# Patient Record
Sex: FEMALE | Race: OTHER | Employment: OTHER | URBAN - METROPOLITAN AREA
[De-identification: names, ages, dates, MRNs, and addresses within clinical notes are randomized per-mention and may not be internally consistent; named-entity substitution may affect disease eponyms.]

---

## 2017-01-01 ENCOUNTER — TELEPHONE (OUTPATIENT)
Dept: OTHER | Age: 82
End: 2017-01-01

## 2017-01-01 ENCOUNTER — APPOINTMENT (OUTPATIENT)
Dept: WOUND CARE | Facility: HOSPITAL | Age: 82
End: 2017-01-01
Payer: MEDICARE

## 2017-01-01 ENCOUNTER — APPOINTMENT (OUTPATIENT)
Dept: WOUND CARE | Facility: HOSPITAL | Age: 82
End: 2017-01-01
Attending: CLINICAL NURSE SPECIALIST
Payer: MEDICARE

## 2017-01-01 ENCOUNTER — HOSPITAL ENCOUNTER (OUTPATIENT)
Dept: GENERAL RADIOLOGY | Age: 82
Discharge: HOME | End: 2017-01-01
Attending: INTERNAL MEDICINE | Admitting: INTERNAL MEDICINE
Payer: MEDICARE

## 2017-01-01 ENCOUNTER — TELEPHONE (OUTPATIENT)
Dept: INTERNAL MEDICINE CLINIC | Facility: CLINIC | Age: 82
End: 2017-01-01

## 2017-01-01 ENCOUNTER — PATIENT OUTREACH (OUTPATIENT)
Dept: CASE MANAGEMENT | Age: 82
End: 2017-01-01

## 2017-01-01 ENCOUNTER — TELEPHONE (OUTPATIENT)
Dept: PULMONOLOGY | Facility: CLINIC | Age: 82
End: 2017-01-01

## 2017-01-01 ENCOUNTER — NURSE TRIAGE (OUTPATIENT)
Dept: OTHER | Age: 82
End: 2017-01-01

## 2017-01-01 ENCOUNTER — OFFICE VISIT (OUTPATIENT)
Dept: INTERNAL MEDICINE CLINIC | Facility: CLINIC | Age: 82
End: 2017-01-01

## 2017-01-01 ENCOUNTER — HOSPITAL ENCOUNTER (OUTPATIENT)
Dept: ULTRASOUND IMAGING | Facility: HOSPITAL | Age: 82
Discharge: HOME | End: 2017-01-01
Attending: INTERNAL MEDICINE
Payer: MEDICARE

## 2017-01-01 ENCOUNTER — OFFICE VISIT (OUTPATIENT)
Dept: PULMONOLOGY | Facility: CLINIC | Age: 82
End: 2017-01-01

## 2017-01-01 ENCOUNTER — HOSPITAL ENCOUNTER (OUTPATIENT)
Dept: GENERAL RADIOLOGY | Facility: HOSPITAL | Age: 82
Discharge: HOME | End: 2017-01-01
Attending: INTERNAL MEDICINE
Payer: MEDICARE

## 2017-01-01 ENCOUNTER — APPOINTMENT (OUTPATIENT)
Dept: WOUND CARE | Facility: HOSPITAL | Age: 82
End: 2017-01-01
Attending: NURSE PRACTITIONER
Payer: MEDICARE

## 2017-01-01 VITALS
RESPIRATION RATE: 20 BRPM | DIASTOLIC BLOOD PRESSURE: 72 MMHG | OXYGEN SATURATION: 94 % | SYSTOLIC BLOOD PRESSURE: 113 MMHG | HEART RATE: 92 BPM | HEIGHT: 60 IN | BODY MASS INDEX: 46.72 KG/M2 | WEIGHT: 238 LBS

## 2017-01-01 VITALS
DIASTOLIC BLOOD PRESSURE: 60 MMHG | TEMPERATURE: 98 F | WEIGHT: 244.13 LBS | BODY MASS INDEX: 48 KG/M2 | SYSTOLIC BLOOD PRESSURE: 102 MMHG | OXYGEN SATURATION: 97 % | HEART RATE: 78 BPM

## 2017-01-01 VITALS
HEART RATE: 83 BPM | SYSTOLIC BLOOD PRESSURE: 117 MMHG | OXYGEN SATURATION: 95 % | DIASTOLIC BLOOD PRESSURE: 63 MMHG | RESPIRATION RATE: 18 BRPM

## 2017-01-01 VITALS
WEIGHT: 240.31 LBS | TEMPERATURE: 98 F | DIASTOLIC BLOOD PRESSURE: 68 MMHG | BODY MASS INDEX: 47 KG/M2 | SYSTOLIC BLOOD PRESSURE: 104 MMHG | HEART RATE: 73 BPM

## 2017-01-01 DIAGNOSIS — I50.9 ACUTE ON CHRONIC CONGESTIVE HEART FAILURE, UNSPECIFIED CONGESTIVE HEART FAILURE TYPE: ICD-10-CM

## 2017-01-01 DIAGNOSIS — R05.9 COUGH: ICD-10-CM

## 2017-01-01 DIAGNOSIS — R05.9 COUGH: Primary | ICD-10-CM

## 2017-01-01 DIAGNOSIS — J90 PLEURAL EFFUSION, LEFT: Primary | ICD-10-CM

## 2017-01-01 DIAGNOSIS — R73.9 HYPERGLYCEMIA: ICD-10-CM

## 2017-01-01 DIAGNOSIS — G47.33 OSA TREATED WITH BIPAP: ICD-10-CM

## 2017-01-01 DIAGNOSIS — S81.802A WOUND OF LEFT LOWER EXTREMITY, INITIAL ENCOUNTER: Primary | ICD-10-CM

## 2017-01-01 DIAGNOSIS — S81.802A WOUND OF LEFT LOWER EXTREMITY, INITIAL ENCOUNTER: ICD-10-CM

## 2017-01-01 DIAGNOSIS — J90 PLEURAL EFFUSION: Primary | ICD-10-CM

## 2017-01-01 DIAGNOSIS — R06.02 SOB (SHORTNESS OF BREATH): ICD-10-CM

## 2017-01-01 DIAGNOSIS — J44.1 COPD EXACERBATION (HCC): ICD-10-CM

## 2017-01-01 DIAGNOSIS — J90 PLEURAL EFFUSION: ICD-10-CM

## 2017-01-01 PROCEDURE — 71010 XR CHEST AP PORTABLE  (CPT=71010): CPT | Performed by: INTERNAL MEDICINE

## 2017-01-01 PROCEDURE — 99213 OFFICE O/P EST LOW 20 MIN: CPT | Performed by: INTERNAL MEDICINE

## 2017-01-01 PROCEDURE — 29581 APPL MULTLAYER CMPRN SYS LEG: CPT

## 2017-01-01 PROCEDURE — 99490 CHRNC CARE MGMT STAFF 1ST 20: CPT | Performed by: INTERNAL MEDICINE

## 2017-01-01 PROCEDURE — 32555 ASPIRATE PLEURA W/ IMAGING: CPT | Performed by: INTERNAL MEDICINE

## 2017-01-01 PROCEDURE — 99213 OFFICE O/P EST LOW 20 MIN: CPT | Performed by: CLINICAL NURSE SPECIALIST

## 2017-01-01 PROCEDURE — G0463 HOSPITAL OUTPT CLINIC VISIT: HCPCS | Performed by: INTERNAL MEDICINE

## 2017-01-01 PROCEDURE — 71020 XR CHEST PA + LAT CHEST (CPT=71020): CPT | Performed by: INTERNAL MEDICINE

## 2017-01-01 RX ORDER — AZITHROMYCIN 250 MG/1
TABLET, FILM COATED ORAL
Qty: 6 TABLET | Refills: 0 | Status: SHIPPED | OUTPATIENT
Start: 2017-01-01 | End: 2017-01-01

## 2017-01-01 RX ORDER — HYDROCODONE BITARTRATE AND ACETAMINOPHEN 5; 325 MG/1; MG/1
1 TABLET ORAL EVERY 6 HOURS PRN
Qty: 100 TABLET | Refills: 0 | Status: SHIPPED | OUTPATIENT
Start: 2017-01-01 | End: 2018-01-01

## 2017-01-01 RX ORDER — FUROSEMIDE 40 MG/1
40 TABLET ORAL 2 TIMES DAILY
Qty: 180 TABLET | Refills: 0 | Status: SHIPPED | OUTPATIENT
Start: 2017-01-01 | End: 2018-01-01

## 2017-01-01 RX ORDER — BENZONATATE 200 MG/1
200 CAPSULE ORAL 3 TIMES DAILY PRN
Qty: 12 CAPSULE | Refills: 0 | Status: SHIPPED | OUTPATIENT
Start: 2017-01-01 | End: 2017-01-01

## 2017-01-01 RX ORDER — METHIMAZOLE 10 MG/1
10 TABLET ORAL DAILY
Qty: 90 TABLET | Refills: 3 | Status: SHIPPED | OUTPATIENT
Start: 2017-01-01 | End: 2018-01-01

## 2017-01-01 RX ORDER — MECLIZINE HCL 12.5 MG/1
12.5 TABLET ORAL 3 TIMES DAILY PRN
Qty: 180 TABLET | Refills: 3 | Status: ON HOLD | OUTPATIENT
Start: 2017-01-01 | End: 2018-01-01

## 2017-01-01 RX ORDER — POTASSIUM CHLORIDE 20 MEQ/1
20 TABLET, EXTENDED RELEASE ORAL 2 TIMES DAILY
Qty: 180 TABLET | Refills: 0 | Status: SHIPPED | OUTPATIENT
Start: 2017-01-01 | End: 2018-01-01

## 2017-01-01 RX ORDER — VALSARTAN 80 MG/1
40 TABLET ORAL DAILY
Qty: 90 TABLET | Refills: 3 | Status: SHIPPED | OUTPATIENT
Start: 2017-01-01 | End: 2018-01-01

## 2017-01-01 RX ORDER — POTASSIUM CHLORIDE 20 MEQ/1
20 TABLET, EXTENDED RELEASE ORAL 2 TIMES DAILY
Qty: 180 TABLET | Refills: 0 | Status: CANCELLED | OUTPATIENT
Start: 2017-01-01

## 2017-01-01 RX ORDER — FLUTICASONE PROPIONATE 50 MCG
2 SPRAY, SUSPENSION (ML) NASAL DAILY
Qty: 1 BOTTLE | Refills: 3 | Status: SHIPPED | OUTPATIENT
Start: 2017-01-01 | End: 2018-01-01

## 2017-01-01 RX ORDER — POTASSIUM CHLORIDE 20 MEQ/1
20 TABLET, EXTENDED RELEASE ORAL
Qty: 90 TABLET | Refills: 0 | Status: SHIPPED | OUTPATIENT
Start: 2017-01-01 | End: 2017-01-01

## 2017-01-01 RX ORDER — FUROSEMIDE 40 MG/1
TABLET ORAL
Qty: 180 TABLET | Refills: 0 | Status: SHIPPED | OUTPATIENT
Start: 2017-01-01 | End: 2018-01-01

## 2017-01-11 ENCOUNTER — HOSPITAL (OUTPATIENT)
Dept: OTHER | Age: 82
End: 2017-01-11
Attending: OTOLARYNGOLOGY

## 2017-01-11 LAB — CREATININE, POC: 0.7 MG/DL (ref 0.51–0.95)

## 2017-01-26 ENCOUNTER — APPOINTMENT (OUTPATIENT)
Dept: CV DIAGNOSTICS | Facility: HOSPITAL | Age: 82
DRG: 193 | End: 2017-01-26
Attending: HOSPITALIST
Payer: MEDICARE

## 2017-01-26 ENCOUNTER — APPOINTMENT (OUTPATIENT)
Dept: GENERAL RADIOLOGY | Facility: HOSPITAL | Age: 82
DRG: 193 | End: 2017-01-26
Attending: EMERGENCY MEDICINE
Payer: MEDICARE

## 2017-01-26 ENCOUNTER — HOSPITAL ENCOUNTER (INPATIENT)
Facility: HOSPITAL | Age: 82
LOS: 4 days | Discharge: HOME | DRG: 193 | End: 2017-01-30
Attending: EMERGENCY MEDICINE | Admitting: HOSPITALIST
Payer: MEDICARE

## 2017-01-26 DIAGNOSIS — R77.8 ELEVATED TROPONIN: ICD-10-CM

## 2017-01-26 DIAGNOSIS — J44.1 COPD EXACERBATION (HCC): ICD-10-CM

## 2017-01-26 DIAGNOSIS — I48.91 ATRIAL FIBRILLATION, NEW ONSET (HCC): Primary | ICD-10-CM

## 2017-01-26 DIAGNOSIS — I50.9 ACUTE ON CHRONIC CONGESTIVE HEART FAILURE, UNSPECIFIED CONGESTIVE HEART FAILURE TYPE: ICD-10-CM

## 2017-01-26 PROBLEM — R79.89 AZOTEMIA: Status: ACTIVE | Noted: 2017-01-26

## 2017-01-26 PROBLEM — R79.89 ELEVATED TROPONIN: Status: ACTIVE | Noted: 2017-01-26

## 2017-01-26 PROBLEM — R73.9 HYPERGLYCEMIA: Status: ACTIVE | Noted: 2017-01-26

## 2017-01-26 LAB
ADENOVIRUS PCR:: NEGATIVE
ANION GAP SERPL CALC-SCNC: 9 MMOL/L (ref 0–18)
B PERT DNA SPEC QL NAA+PROBE: NEGATIVE
BASOPHILS # BLD: 0.1 K/UL (ref 0–0.2)
BASOPHILS NFR BLD: 1 %
BILIRUB UR QL: NEGATIVE
BNP SERPL-MCNC: 230 PG/ML (ref 0–100)
BUN SERPL-MCNC: 14 MG/DL (ref 8–20)
BUN/CREAT SERPL: 21.5 (ref 10–20)
C PNEUM DNA SPEC QL NAA+PROBE: NEGATIVE
CALCIUM SERPL-MCNC: 9.2 MG/DL (ref 8.5–10.5)
CHLORIDE SERPL-SCNC: 102 MMOL/L (ref 95–110)
CHOLEST SERPL-MCNC: 168 MG/DL (ref 110–200)
CLARITY UR: CLEAR
CO2 SERPL-SCNC: 27 MMOL/L (ref 22–32)
COLOR UR: YELLOW
CORONAVIRUS 229E PCR:: NEGATIVE
CORONAVIRUS HKU1 PCR:: NEGATIVE
CORONAVIRUS NL63 PCR:: NEGATIVE
CORONAVIRUS OC43 PCR:: POSITIVE
CREAT SERPL-MCNC: 0.65 MG/DL (ref 0.5–1.5)
EOSINOPHIL # BLD: 0 K/UL (ref 0–0.7)
EOSINOPHIL NFR BLD: 0 %
ERYTHROCYTE [DISTWIDTH] IN BLOOD BY AUTOMATED COUNT: 14.8 % (ref 11–15)
FLUAV + FLUBV RNA SPEC NAA+PROBE: NEGATIVE
FLUAV H1 2009 PAND RNA SPEC QL NAA+PROBE: NEGATIVE
FLUAV H1 RNA SPEC QL NAA+PROBE: NEGATIVE
FLUAV H3 RNA SPEC QL NAA+PROBE: NEGATIVE
FLUAV RNA SPEC QL NAA+PROBE: NEGATIVE
FLUBV RNA SPEC QL NAA+PROBE: NEGATIVE
GLUCOSE SERPL-MCNC: 139 MG/DL (ref 70–99)
GLUCOSE UR-MCNC: NEGATIVE MG/DL
HCT VFR BLD AUTO: 48 % (ref 35–48)
HDLC SERPL-MCNC: 48 MG/DL
HGB BLD-MCNC: 15.8 G/DL (ref 12–16)
HGB UR QL STRIP.AUTO: NEGATIVE
KETONES UR-MCNC: NEGATIVE MG/DL
LDLC SERPL CALC-MCNC: 95 MG/DL (ref 0–99)
LEUKOCYTE ESTERASE UR QL STRIP.AUTO: NEGATIVE
LYMPHOCYTES # BLD: 0.7 K/UL (ref 1–4)
LYMPHOCYTES NFR BLD: 7 %
MCH RBC QN AUTO: 31 PG (ref 27–32)
MCHC RBC AUTO-ENTMCNC: 33 G/DL (ref 32–37)
MCV RBC AUTO: 94.1 FL (ref 80–100)
METAPNEUMOVIRUS PCR:: NEGATIVE
MONOCYTES # BLD: 0.6 K/UL (ref 0–1)
MONOCYTES NFR BLD: 6 %
MYCOPLASMA PNEUMONIA PCR:: NEGATIVE
NEUTROPHILS # BLD AUTO: 9.2 K/UL (ref 1.8–7.7)
NEUTROPHILS NFR BLD: 86 %
NITRITE UR QL STRIP.AUTO: NEGATIVE
NONHDLC SERPL-MCNC: 120 MG/DL
OSMOLALITY UR CALC.SUM OF ELEC: 289 MOSM/KG (ref 275–295)
PARAINFLUENZA 1 PCR:: NEGATIVE
PARAINFLUENZA 2 PCR:: NEGATIVE
PARAINFLUENZA 3 PCR:: NEGATIVE
PARAINFLUENZA 4 PCR:: NEGATIVE
PH UR: 6 [PH] (ref 5–8)
PLATELET # BLD AUTO: 173 K/UL (ref 140–400)
PMV BLD AUTO: 9.7 FL (ref 7.4–10.3)
POTASSIUM SERPL-SCNC: 3.7 MMOL/L (ref 3.3–5.1)
PROT UR-MCNC: NEGATIVE MG/DL
RBC # BLD AUTO: 5.1 M/UL (ref 3.7–5.4)
RHINOVIRUS/ENTERO PCR:: NEGATIVE
RSV RNA SPEC QL NAA+PROBE: NEGATIVE
SODIUM SERPL-SCNC: 138 MMOL/L (ref 136–144)
SP GR UR STRIP: 1.01 (ref 1–1.03)
T3 SERPL-MCNC: 0.52 NG/ML (ref 0.87–1.78)
T4 FREE SERPL-MCNC: 1.09 NG/DL (ref 0.58–1.64)
TRIGL SERPL-MCNC: 125 MG/DL (ref 1–149)
TROPONIN I SERPL-MCNC: 0.04 NG/ML (ref ?–0.03)
TSH SERPL-ACNC: 0.29 UIU/ML (ref 0.34–5.6)
UROBILINOGEN UR STRIP-ACNC: <2
VIT C UR-MCNC: NEGATIVE MG/DL
WBC # BLD AUTO: 10.7 K/UL (ref 4–11)

## 2017-01-26 PROCEDURE — 71010 XR CHEST AP PORTABLE  (CPT=71010): CPT

## 2017-01-26 PROCEDURE — 99223 1ST HOSP IP/OBS HIGH 75: CPT | Performed by: HOSPITALIST

## 2017-01-26 PROCEDURE — 93306 TTE W/DOPPLER COMPLETE: CPT

## 2017-01-26 PROCEDURE — B24BZZ4 ULTRASONOGRAPHY OF HEART WITH AORTA, TRANSESOPHAGEAL: ICD-10-PCS | Performed by: HOSPITALIST

## 2017-01-26 PROCEDURE — 4A02XFZ MEASUREMENT OF CARDIAC RHYTHM, EXTERNAL APPROACH: ICD-10-PCS | Performed by: HOSPITALIST

## 2017-01-26 PROCEDURE — BW03ZZZ PLAIN RADIOGRAPHY OF CHEST: ICD-10-PCS | Performed by: HOSPITALIST

## 2017-01-26 PROCEDURE — 93306 TTE W/DOPPLER COMPLETE: CPT | Performed by: INTERNAL MEDICINE

## 2017-01-26 RX ORDER — MECLIZINE HCL 12.5 MG/1
12.5 TABLET ORAL 3 TIMES DAILY PRN
Status: DISCONTINUED | OUTPATIENT
Start: 2017-01-26 | End: 2017-01-30

## 2017-01-26 RX ORDER — DILTIAZEM HYDROCHLORIDE 5 MG/ML
10 INJECTION INTRAVENOUS ONCE
Status: COMPLETED | OUTPATIENT
Start: 2017-01-26 | End: 2017-01-26

## 2017-01-26 RX ORDER — OYSTER SHELL CALCIUM WITH VITAMIN D 500; 200 MG/1; [IU]/1
1 TABLET, FILM COATED ORAL DAILY
COMMUNITY
End: 2017-03-07 | Stop reason: ALTCHOICE

## 2017-01-26 RX ORDER — POTASSIUM CHLORIDE 20 MEQ/1
40 TABLET, EXTENDED RELEASE ORAL ONCE
Status: COMPLETED | OUTPATIENT
Start: 2017-01-26 | End: 2017-01-26

## 2017-01-26 RX ORDER — MELATONIN
800 DAILY
Status: ON HOLD | COMMUNITY
End: 2018-01-01

## 2017-01-26 RX ORDER — AZITHROMYCIN 250 MG/1
250 TABLET, FILM COATED ORAL DAILY
Status: ON HOLD | COMMUNITY
End: 2017-01-30

## 2017-01-26 RX ORDER — ASPIRIN 81 MG/1
81 TABLET ORAL DAILY
Status: DISCONTINUED | OUTPATIENT
Start: 2017-01-26 | End: 2017-01-26

## 2017-01-26 RX ORDER — GABAPENTIN 100 MG/1
100 CAPSULE ORAL NIGHTLY
Status: DISCONTINUED | OUTPATIENT
Start: 2017-01-26 | End: 2017-01-30

## 2017-01-26 RX ORDER — NITROGLYCERIN 0.4 MG/1
0.4 TABLET SUBLINGUAL EVERY 5 MIN PRN
Status: DISCONTINUED | OUTPATIENT
Start: 2017-01-26 | End: 2017-01-30

## 2017-01-26 RX ORDER — MULTIVITAMIN WITH IRON
250 TABLET ORAL DAILY
COMMUNITY
End: 2018-01-01

## 2017-01-26 RX ORDER — METHYLPREDNISOLONE SODIUM SUCCINATE 125 MG/2ML
125 INJECTION, POWDER, LYOPHILIZED, FOR SOLUTION INTRAMUSCULAR; INTRAVENOUS ONCE
Status: COMPLETED | OUTPATIENT
Start: 2017-01-26 | End: 2017-01-26

## 2017-01-26 RX ORDER — PANTOPRAZOLE SODIUM 40 MG/1
40 TABLET, DELAYED RELEASE ORAL
Status: DISCONTINUED | OUTPATIENT
Start: 2017-01-26 | End: 2017-01-30

## 2017-01-26 RX ORDER — IPRATROPIUM BROMIDE AND ALBUTEROL SULFATE 2.5; .5 MG/3ML; MG/3ML
3 SOLUTION RESPIRATORY (INHALATION) ONCE
Status: COMPLETED | OUTPATIENT
Start: 2017-01-26 | End: 2017-01-26

## 2017-01-26 RX ORDER — FUROSEMIDE 40 MG/1
40 TABLET ORAL DAILY
COMMUNITY
End: 2017-03-28

## 2017-01-26 RX ORDER — CHOLECALCIFEROL (VITAMIN D3) 1250 MCG
50000 CAPSULE ORAL SEE ADMIN INSTRUCTIONS
COMMUNITY
End: 2017-03-07 | Stop reason: ALTCHOICE

## 2017-01-26 RX ORDER — FUROSEMIDE 10 MG/ML
20 INJECTION INTRAMUSCULAR; INTRAVENOUS ONCE
Status: COMPLETED | OUTPATIENT
Start: 2017-01-26 | End: 2017-01-26

## 2017-01-26 RX ORDER — HYDROCODONE BITARTRATE AND ACETAMINOPHEN 5; 325 MG/1; MG/1
1 TABLET ORAL EVERY 6 HOURS PRN
COMMUNITY
End: 2017-02-23

## 2017-01-26 RX ORDER — GABAPENTIN 100 MG/1
100 CAPSULE ORAL NIGHTLY
COMMUNITY
End: 2017-10-17

## 2017-01-26 RX ORDER — ALBUTEROL SULFATE 2.5 MG/3ML
2.5 SOLUTION RESPIRATORY (INHALATION) 3 TIMES DAILY
COMMUNITY
End: 2017-02-01 | Stop reason: ALTCHOICE

## 2017-01-26 RX ORDER — DILTIAZEM HYDROCHLORIDE 60 MG/1
60 TABLET, FILM COATED ORAL AS NEEDED
Status: DISCONTINUED | OUTPATIENT
Start: 2017-01-26 | End: 2017-01-30

## 2017-01-26 RX ORDER — METHYLPREDNISOLONE SODIUM SUCCINATE 40 MG/ML
60 INJECTION, POWDER, LYOPHILIZED, FOR SOLUTION INTRAMUSCULAR; INTRAVENOUS EVERY 6 HOURS
Status: DISCONTINUED | OUTPATIENT
Start: 2017-01-26 | End: 2017-01-28

## 2017-01-26 RX ORDER — DILTIAZEM HYDROCHLORIDE 5 MG/ML
10 INJECTION INTRAVENOUS
Status: DISCONTINUED | OUTPATIENT
Start: 2017-01-26 | End: 2017-01-30

## 2017-01-26 RX ORDER — OYSTER SHELL CALCIUM WITH VITAMIN D 500; 200 MG/1; [IU]/1
1 TABLET, FILM COATED ORAL DAILY
Status: DISCONTINUED | OUTPATIENT
Start: 2017-01-26 | End: 2017-01-30

## 2017-01-26 RX ORDER — ATORVASTATIN CALCIUM 10 MG/1
10 TABLET, FILM COATED ORAL NIGHTLY
Status: DISCONTINUED | OUTPATIENT
Start: 2017-01-26 | End: 2017-01-30

## 2017-01-26 RX ORDER — ERGOCALCIFEROL 1.25 MG/1
50000 CAPSULE ORAL WEEKLY
Status: DISCONTINUED | OUTPATIENT
Start: 2017-01-26 | End: 2017-01-30

## 2017-01-26 RX ORDER — SODIUM CHLORIDE 0.9 % (FLUSH) 0.9 %
10 SYRINGE (ML) INJECTION AS NEEDED
Status: DISCONTINUED | OUTPATIENT
Start: 2017-01-26 | End: 2017-01-30

## 2017-01-26 RX ORDER — OMEPRAZOLE 40 MG/1
40 CAPSULE, DELAYED RELEASE ORAL DAILY
COMMUNITY
End: 2017-05-20

## 2017-01-26 RX ORDER — AZITHROMYCIN 250 MG/1
250 TABLET, FILM COATED ORAL DAILY
Status: DISCONTINUED | OUTPATIENT
Start: 2017-01-26 | End: 2017-01-30

## 2017-01-26 RX ORDER — ONDANSETRON 2 MG/ML
4 INJECTION INTRAMUSCULAR; INTRAVENOUS EVERY 6 HOURS PRN
Status: DISCONTINUED | OUTPATIENT
Start: 2017-01-26 | End: 2017-01-30

## 2017-01-26 RX ORDER — MECLIZINE HCL 12.5 MG/1
12.5 TABLET ORAL 3 TIMES DAILY PRN
COMMUNITY
End: 2017-10-17

## 2017-01-26 RX ORDER — SIMVASTATIN 5 MG
20 TABLET ORAL NIGHTLY
COMMUNITY
End: 2018-01-01

## 2017-01-26 RX ORDER — PANTOPRAZOLE SODIUM 40 MG/1
40 TABLET, DELAYED RELEASE ORAL
Status: DISCONTINUED | OUTPATIENT
Start: 2017-01-26 | End: 2017-01-26

## 2017-01-26 RX ORDER — DILTIAZEM HYDROCHLORIDE 120 MG/1
120 CAPSULE, COATED, EXTENDED RELEASE ORAL DAILY
Status: DISCONTINUED | OUTPATIENT
Start: 2017-01-26 | End: 2017-01-30

## 2017-01-26 RX ORDER — POTASSIUM CHLORIDE 1500 MG/1
20 TABLET, FILM COATED, EXTENDED RELEASE ORAL DAILY
COMMUNITY
End: 2017-02-07

## 2017-01-26 RX ORDER — ALBUTEROL SULFATE 2.5 MG/3ML
1.25 SOLUTION RESPIRATORY (INHALATION)
Status: DISCONTINUED | OUTPATIENT
Start: 2017-01-26 | End: 2017-01-27

## 2017-01-26 RX ORDER — DEXTROMETHORPHAN POLISTIREX 30 MG/5ML
5 SUSPENSION ORAL EVERY 8 HOURS
Status: DISCONTINUED | OUTPATIENT
Start: 2017-01-26 | End: 2017-01-27

## 2017-01-26 RX ORDER — ASPIRIN 81 MG/1
324 TABLET, CHEWABLE ORAL ONCE
Status: COMPLETED | OUTPATIENT
Start: 2017-01-26 | End: 2017-01-26

## 2017-01-26 RX ORDER — MELATONIN
400 DAILY
Status: DISCONTINUED | OUTPATIENT
Start: 2017-01-26 | End: 2017-01-30

## 2017-01-26 RX ORDER — HEPARIN SODIUM 5000 [USP'U]/ML
5000 INJECTION, SOLUTION INTRAVENOUS; SUBCUTANEOUS EVERY 12 HOURS
Status: DISCONTINUED | OUTPATIENT
Start: 2017-01-26 | End: 2017-01-26

## 2017-01-26 NOTE — ED INITIAL ASSESSMENT (HPI)
Pt was dx with bronchitis today and had taken 3 albuterol treatments at home with no relief. Pt was actively using her neb machine when ems arrived.

## 2017-01-26 NOTE — H&P
[facility]    PATIENT'S NAME: Ramona Quiroz PHYSICIAN: Aure Harris MD   PATIENT ACCOUNT#:   [id number]    LOCATION:  [address] [place] [address] RECORD #:   [id number]       YOB: 1932  ADMISSION DATE:       01/26/2017 10.7 with 86% neutrophils. Urine revealed no sign of infection. Influenza A, B and RSV were negative. Her chest x-ray revealed cardiomegaly with perihilar consolidations most consistent with congestive heart failure and pulmonary edema.   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 Lives with her son and daughter-in-law. REVIEW OF SYSTEMS:  Twelve systems were reviewed. The patient at times tends to be constipated, but most of the time has relatively normal frequency of her bowel movements.   There are secondary to viral process last week. Will place on nebulizers, IV steroids, and Zithromax for the possibility of atypical infection. There may also have been a component of congestive heart failure contributing to her shortness of breath.   She was, ther [de-identified] PA is Dr. Esperanza Koenig at [facility].  Would facilitate transfer as soon as the patient is stable enough, per family request.  Discussed this with a.m. hospitalist.    Dictated By Keyla Mcdonald MD  d: 01/26/2017 09:16:44  t: 01/2

## 2017-01-26 NOTE — CONSULTS
[facility] HOSP - [facility]    Cardiology Consultation    [facility] Location: [facility] 3W/    1932 MRN [id number]   Consulting Date 2017 [facility] [id number]   Consulting Physician Enma Lynch MD Attending Physician Deb Arzate MD OR, Daily    01/26 1150       gabapentin (NEURONTIN) cap 100 mg Ordered       100 mg, OR, Nightly           Heparin Sodium (Porcine) 5000 UNIT/ML injection 5,000 Units Given       5,000 Units, SC, Q12H    01/26 1150       MethylPREDNISolone Sodium Succ (So  01/26/2017   K 3.7 01/26/2017    01/26/2017   CO2 27 01/26/2017    01/26/2017   CA 9.2 01/26/2017   T4F 1.09 01/26/2017   TSH 0.29 01/26/2017   TROP 0.04 01/26/2017       IMPRESSIONS:  1) Atrial fibrillation, new diagnosis  2) HTN, wi

## 2017-01-26 NOTE — ED PROVIDER NOTES
Patient Seen in: [facility] AND [facility] Emergency Department    History   Patient presents with:  Dyspnea KHADIJAH SOB (respiratory)    Stated Complaint: bronchitis    HPI    54-year-old female presents by ambulance. She is [place] speaking.   Her family is at the Pulmonary/Chest: She has decreased breath sounds in the right lower field and the left lower field. Tachypneic, increased work of breathing, diffuse bilateral wheeze   Abdominal: Soft. Bowel sounds are normal. She exhibits no distension and no mass.  Bernett Scheuermann DARK GREEN   RAINBOW DRAW LAVENDER TALL (BNP)   INFLUENZA A/B AND RSV PCR      EKG    Rate, intervals and axes as noted on EKG Report. Rate: 119  Rhythm: Atrial Fibrillation  Reading: atrial fibrillation.  Abnormal EKG>             MDM     EKG is atrial fi medications for this patient.       Present on Admission           ICD-10-CM Noted POA    Atrial fibrillation, new onset ([facility] [address] I48.91 1/26/2017 Unknown    Azotemia R79.89 1/26/2017 Yes    Hyperglycemia R73.9 1/26/2017 Yes

## 2017-01-26 NOTE — DISCHARGE PLANNING
SW received MDO for [facility].   Per rounds, pt's family working on transfer to [facility].    SW available should pt not transfer to another hospital.    Trevon Kothari [facility]  [id number]

## 2017-01-27 LAB
BASOPHILS # BLD: 0 K/UL (ref 0–0.2)
BASOPHILS NFR BLD: 0 %
EOSINOPHIL # BLD: 0 K/UL (ref 0–0.7)
EOSINOPHIL NFR BLD: 0 %
ERYTHROCYTE [DISTWIDTH] IN BLOOD BY AUTOMATED COUNT: 15 % (ref 11–15)
HCT VFR BLD AUTO: 45.4 % (ref 35–48)
HGB BLD-MCNC: 14.8 G/DL (ref 12–16)
LYMPHOCYTES # BLD: 0.4 K/UL (ref 1–4)
LYMPHOCYTES NFR BLD: 6 %
MCH RBC QN AUTO: 30.8 PG (ref 27–32)
MCHC RBC AUTO-ENTMCNC: 32.5 G/DL (ref 32–37)
MCV RBC AUTO: 94.7 FL (ref 80–100)
MONOCYTES # BLD: 0.1 K/UL (ref 0–1)
MONOCYTES NFR BLD: 2 %
NEUTROPHILS # BLD AUTO: 7 K/UL (ref 1.8–7.7)
NEUTROPHILS NFR BLD: 93 %
PLATELET # BLD AUTO: 181 K/UL (ref 140–400)
PMV BLD AUTO: 9.9 FL (ref 7.4–10.3)
POTASSIUM SERPL-SCNC: 3.9 MMOL/L (ref 3.3–5.1)
RBC # BLD AUTO: 4.8 M/UL (ref 3.7–5.4)
WBC # BLD AUTO: 7.5 K/UL (ref 4–11)

## 2017-01-27 PROCEDURE — 99233 SBSQ HOSP IP/OBS HIGH 50: CPT | Performed by: HOSPITALIST

## 2017-01-27 RX ORDER — ALBUTEROL SULFATE 2.5 MG/3ML
1.25 SOLUTION RESPIRATORY (INHALATION)
Status: DISCONTINUED | OUTPATIENT
Start: 2017-01-27 | End: 2017-01-27 | Stop reason: SDUPTHER

## 2017-01-27 RX ORDER — GUAIFENESIN/DEXTROMETHORPHAN 100-10MG/5
100 SYRUP ORAL EVERY 4 HOURS PRN
Status: DISCONTINUED | OUTPATIENT
Start: 2017-01-27 | End: 2017-01-30

## 2017-01-27 RX ORDER — ALBUTEROL SULFATE 1.5 MG/3ML
1.25 SOLUTION RESPIRATORY (INHALATION)
Status: DISCONTINUED | OUTPATIENT
Start: 2017-01-27 | End: 2017-01-29

## 2017-01-27 RX ORDER — BENZONATATE 100 MG/1
100 CAPSULE ORAL 3 TIMES DAILY
Status: DISCONTINUED | OUTPATIENT
Start: 2017-01-27 | End: 2017-01-30

## 2017-01-27 NOTE — PLAN OF CARE
Pt is on 3L of 02 and sats at 95%, strong, moist productive cough given dextramethoraphan; complains of SOB and gets respiratory treatments. Pt started on Cardizem 120mg once daily for new on-set of A. Fib.  Pt also take off subq Heparin and started on Xare

## 2017-01-27 NOTE — PLAN OF CARE
CARDIOVASCULAR - ADULT    • Maintains optimal cardiac output and hemodynamic stability Progressing    • Absence of cardiac arrhythmias or at baseline Progressing    Stable, will continue to monitor     Patient/Family Goals    • Patient/Family [address] [facility]

## 2017-01-27 NOTE — PROGRESS NOTES
[place] [facility] - [facility]    Progress Note    Ladonna Morillo Patient Status:  Inpatient    1932 MRN [id number]   Location [facility] 3W/SW Attending Nata Hernandez, [address] [facility] Day # 1 PCP No primary care provider on file.        Subjective: on 01/26/2017 at 11:00 by Patricia Wisdom 12-lead    1/26/2017  ECG Report  Interpretation  -------------------------- Atrial fibrillation Low voltage in precordial leads.  -Nonspecific ST depression -Nondiagnostic.  ABNORMAL No previous ECGs availab

## 2017-01-27 NOTE — PROGRESS NOTES
[facility]    Progress Note    Tete Collins Patient Status:  Inpatient    1932 MRN [id number]   Location [facility] 3W/SW Attending Nimesh Fuller, [address] [facility]  Day # 1 PCP No primary care provider on file.      Subjective: PCP is Dr. Kamari Ochoa- [phone]. A message was left to discuss patient status.      Results:     Lab Results  Component Value Date   WBC 7.5 01/27/2017   HGB 14.8 01/27/2017   HCT 45.4 01/27/2017    01/27/2017   CREATSERUM 0.65 01/26/2

## 2017-01-27 NOTE — PROGRESS NOTES
[facility]    Cardiology - [facility]  Progress Note    Lucio Christian Patient Status:  Inpatient    1932 MRN [id number]   Location [facility] 3W/SW Attending Gisel Nolen, [facility] Day # 1 PCP No primary care provider on file. mucosa  Neck - JVP 15 cm H2O, carotids normal  Lungs - coarse bs througout all fields, transmitted upper airway bs  Heart - regular, nl S1/S2  Abd - soft, nontender; central adiposity  Ext - no edema, warm, well-perfused  Neuro - alert+Ox3, no facial droop

## 2017-01-28 LAB
ANION GAP SERPL CALC-SCNC: 10 MMOL/L (ref 0–18)
BASOPHILS # BLD: 0 K/UL (ref 0–0.2)
BASOPHILS NFR BLD: 0 %
BUN SERPL-MCNC: 29 MG/DL (ref 8–20)
BUN/CREAT SERPL: 34.1 (ref 10–20)
CALCIUM SERPL-MCNC: 9.7 MG/DL (ref 8.5–10.5)
CHLORIDE SERPL-SCNC: 101 MMOL/L (ref 95–110)
CO2 SERPL-SCNC: 27 MMOL/L (ref 22–32)
CREAT SERPL-MCNC: 0.85 MG/DL (ref 0.5–1.5)
EOSINOPHIL # BLD: 0 K/UL (ref 0–0.7)
EOSINOPHIL NFR BLD: 0 %
ERYTHROCYTE [DISTWIDTH] IN BLOOD BY AUTOMATED COUNT: 15 % (ref 11–15)
GLUCOSE BLDC GLUCOMTR-MCNC: 217 MG/DL (ref 70–99)
GLUCOSE SERPL-MCNC: 187 MG/DL (ref 70–99)
HCT VFR BLD AUTO: 44.1 % (ref 35–48)
HGB BLD-MCNC: 14.5 G/DL (ref 12–16)
L PNEUMO AG UR QL: NEGATIVE
LYMPHOCYTES # BLD: 0.4 K/UL (ref 1–4)
LYMPHOCYTES NFR BLD: 4 %
MCH RBC QN AUTO: 30.8 PG (ref 27–32)
MCHC RBC AUTO-ENTMCNC: 32.8 G/DL (ref 32–37)
MCV RBC AUTO: 93.9 FL (ref 80–100)
MONOCYTES # BLD: 0.2 K/UL (ref 0–1)
MONOCYTES NFR BLD: 2 %
NEUTROPHILS # BLD AUTO: 10.4 K/UL (ref 1.8–7.7)
NEUTROPHILS NFR BLD: 95 %
OSMOLALITY UR CALC.SUM OF ELEC: 297 MOSM/KG (ref 275–295)
PLATELET # BLD AUTO: 188 K/UL (ref 140–400)
PMV BLD AUTO: 9.5 FL (ref 7.4–10.3)
POTASSIUM SERPL-SCNC: 3.9 MMOL/L (ref 3.3–5.1)
RBC # BLD AUTO: 4.7 M/UL (ref 3.7–5.4)
SODIUM SERPL-SCNC: 138 MMOL/L (ref 136–144)
STREP PNEUMO ANTIGEN, URINE: NEGATIVE
WBC # BLD AUTO: 11 K/UL (ref 4–11)

## 2017-01-28 PROCEDURE — 99233 SBSQ HOSP IP/OBS HIGH 50: CPT | Performed by: HOSPITALIST

## 2017-01-28 RX ORDER — METHYLPREDNISOLONE SODIUM SUCCINATE 40 MG/ML
40 INJECTION, POWDER, LYOPHILIZED, FOR SOLUTION INTRAMUSCULAR; INTRAVENOUS EVERY 6 HOURS
Status: DISCONTINUED | OUTPATIENT
Start: 2017-01-28 | End: 2017-01-29

## 2017-01-28 RX ORDER — SODIUM CHLORIDE 0.9 % (FLUSH) 0.9 %
SYRINGE (ML) INJECTION
Status: COMPLETED
Start: 2017-01-28 | End: 2017-01-28

## 2017-01-28 NOTE — PROGRESS NOTES
[place] [facility] - [facility]    Cardiology Progress Note    Tiki Ramírez Patient Status:  Inpatient    1932 MRN [id number]   Location [facility] 3W/SW Attending Omer Holloway, [address] [facility]  Day # 2 PCP No primary care provider on file.          As Carbonate-Vitamin D  1 tablet Oral Daily   • folic acid  831 mcg Oral Daily   • MethylPREDNISolone Sodium Succ  60 mg Intravenous Q6H   • DiltiaZEM HCl ER Coated Beads  120 mg Oral Daily         Results:     Lab Results  Component Value Date   WBC 7.5 01/2

## 2017-01-28 NOTE — PROGRESS NOTES
[place] [facility] - [facility]    Progress Note    Raymond Pichardo Patient Status:  Inpatient    1932 MRN [id number]   Location [facility] 3W/SW Attending Julio Miller, [address] [facility] Day # 2 PCP No primary care provider on file.        Subjective:   B

## 2017-01-29 PROCEDURE — 99232 SBSQ HOSP IP/OBS MODERATE 35: CPT | Performed by: HOSPITALIST

## 2017-01-29 RX ORDER — METHYLPREDNISOLONE SODIUM SUCCINATE 40 MG/ML
40 INJECTION, POWDER, LYOPHILIZED, FOR SOLUTION INTRAMUSCULAR; INTRAVENOUS EVERY 12 HOURS
Status: DISCONTINUED | OUTPATIENT
Start: 2017-01-30 | End: 2017-01-30

## 2017-01-29 RX ORDER — ACETAMINOPHEN 500 MG
500 TABLET ORAL EVERY 6 HOURS PRN
Status: DISCONTINUED | OUTPATIENT
Start: 2017-01-29 | End: 2017-01-30

## 2017-01-29 RX ORDER — ALBUTEROL SULFATE 1.5 MG/3ML
1.25 SOLUTION RESPIRATORY (INHALATION)
Status: DISCONTINUED | OUTPATIENT
Start: 2017-01-29 | End: 2017-01-30

## 2017-01-29 RX ORDER — ALBUTEROL SULFATE 1.5 MG/3ML
1.25 SOLUTION RESPIRATORY (INHALATION) EVERY 6 HOURS PRN
Status: DISCONTINUED | OUTPATIENT
Start: 2017-01-29 | End: 2017-01-30

## 2017-01-29 NOTE — PROGRESS NOTES
[place] [facility] - [facility]    Progress Note    Sondra Whittaker Patient Status:  Inpatient    1932 MRN [id number]   Location [facility] 3W/SW Attending Stevo Santos, [address] [facility] [address] Day # 3 PCP No primary care provider on file.        Subjective:   F

## 2017-01-29 NOTE — PROGRESS NOTES
[facility]    Cardiology Progress Note    Seng Berry Patient Status:  Inpatient    1932 MRN [id number]   Location [facility] 3W/SW Attending Julien Nelson, [address] [facility] Day # 3 PCP No primary care provider on file.          As Calcium  10 mg Oral Nightly   • azithromycin  250 mg Oral Daily   • Calcium Carbonate-Vitamin D  1 tablet Oral Daily   • folic acid  982 mcg Oral Daily   • DiltiaZEM HCl ER Coated Beads  120 mg Oral Daily         Results:     Lab Results  Component Value D

## 2017-01-30 VITALS
DIASTOLIC BLOOD PRESSURE: 78 MMHG | HEART RATE: 92 BPM | SYSTOLIC BLOOD PRESSURE: 147 MMHG | TEMPERATURE: 97 F | HEIGHT: 60 IN | OXYGEN SATURATION: 97 % | WEIGHT: 244 LBS | BODY MASS INDEX: 47.91 KG/M2 | RESPIRATION RATE: 18 BRPM

## 2017-01-30 PROCEDURE — 99239 HOSP IP/OBS DSCHRG MGMT >30: CPT | Performed by: HOSPITALIST

## 2017-01-30 RX ORDER — GUAIFENESIN/DEXTROMETHORPHAN 100-10MG/5
5 SYRUP ORAL EVERY 4 HOURS PRN
Qty: 1 BOTTLE | Refills: 0 | Status: SHIPPED | OUTPATIENT
Start: 2017-01-30 | End: 2017-02-01

## 2017-01-30 RX ORDER — PREDNISONE 20 MG/1
TABLET ORAL
Qty: 12 TABLET | Refills: 0 | Status: SHIPPED | OUTPATIENT
Start: 2017-01-30 | End: 2017-02-15

## 2017-01-30 RX ORDER — BENZONATATE 100 MG/1
100 CAPSULE ORAL 3 TIMES DAILY
Qty: 30 CAPSULE | Refills: 0 | Status: SHIPPED | OUTPATIENT
Start: 2017-01-30 | End: 2017-02-09

## 2017-01-30 RX ORDER — DILTIAZEM HYDROCHLORIDE 120 MG/1
120 CAPSULE, COATED, EXTENDED RELEASE ORAL DAILY
Qty: 30 CAPSULE | Refills: 1 | Status: SHIPPED | OUTPATIENT
Start: 2017-01-30 | End: 2017-03-28

## 2017-01-30 NOTE — PLAN OF CARE
Altered Communication/Language Barrier    • Patient/Family is able to understand and participate in their care Progressing    PATIENT PRIMARY LANG IS Arabic, BUT UNDERSTANDS AND SPEAKS ENGLISH

## 2017-01-30 NOTE — PLAN OF CARE
CARDIOVASCULAR - ADULT    • Absence of cardiac arrhythmias or at baseline Progressing    TELE IN PLACE. NO CHANGES.      DISCHARGE PLANNING    • Discharge to home or other facility with appropriate resources [address]

## 2017-01-30 NOTE — DISCHARGE SUMMARY
[place] [facility] - [facility]    Discharge Summary    Shikha Muñiz Patient Status:  Inpatient    1932 MRN [id number]   Location [facility] 3W/SW Attending Renee Araujo, [facility] Day # 4 PCP No primary care provider on file.      Date of Adm frequently than usual.  She does have a history of COPD and uses nebulizers about 3 to 4 times a day.  Yesterday, however, she used it 6 to 8 times a day.  She woke up in the night and got up to use the nebulizer.  Family was concerned that her frequent us coronavirus pneumonia.   Doubt CHF. Much improved. Pt given nebs and IV steroids. PO pred taper on dc. Cont home inhalers. Completed course of azithromycin. New atrial fibrillation with rapid ventricular response.   Rate controlled now.   - EP was o time this was given:  15 mg on 1/30/2017  9:13 AM   Commonly known as:  Eduardo Severs   Start taking on:  1/31/2017        Take 1 tablet (20 mg total) by mouth daily with food.     Quantity:  30 tablet   Refills:  5         CONTINUE taking these medications mg on 1/30/2017  9:13 AM   Commonly known as:  LOPRESSOR        Take 25 mg by mouth 2 (two) times daily. Refills:  0       Omeprazole 40 MG Cpdr        Take 40 mg by mouth daily.     Refills:  0       Potassium Chloride ER 20 MEQ Tbcr        Take 20 mg b

## 2017-01-30 NOTE — CM/SW NOTE
Met with patient at bedside to deliver IM. Patient asked CTL to speak with her daughter Jose Quiroz over the phone. Lancaster Richie states patient lives with her.  Per Daughter, they do not have any discharge/home going needs, but will talk with the patient's pcp if the pa

## 2017-01-30 NOTE — PLAN OF CARE
Altered Communication/Language Barrier    • Patient/Family is able to understand and participate in their care Adequate for Discharge        CARDIOVASCULAR - ADULT    • Maintains optimal cardiac output and hemodynamic stability Adequate for Discharge    •

## 2017-01-30 NOTE — PROGRESS NOTES
[facility] Card[facility]  Progress Note    Chetna Camargo Patient Status:  Inpatient    1932 MRN [id number]   Location [facility] 3W/SW Attending Charity Maloney, [address] [facility] Day # 4 PCP No primary care provider on 01/28/2017   HGB 14.5 01/28/2017   HCT 44.1 01/28/2017    01/28/2017   CREATSERUM 0.85 01/28/2017   BUN 29* 01/28/2017    01/28/2017   K 3.9 01/28/2017    01/28/2017   CO2 27 01/28/2017   * 01/28/2017   CA 9.7 01/28/2017   T4F 1.0

## 2017-01-31 ENCOUNTER — TELEPHONE (OUTPATIENT)
Dept: CARDIOLOGY UNIT | Facility: HOSPITAL | Age: 82
End: 2017-01-31

## 2017-01-31 NOTE — CM/SW NOTE
+BPCI.  CTL contacted the patient's daughter Josejodi Quiroz to explain and enroll Mariangel in the Medicare BPCI program under  (A-fib). She agreed w/ phone f/u for 3 months from [employer].   BPCI  Letter, brochure, [facility] free medication delivery brochu

## 2017-02-01 ENCOUNTER — OFFICE VISIT (OUTPATIENT)
Dept: CARDIOLOGY CLINIC | Facility: HOSPITAL | Age: 82
End: 2017-02-01
Attending: INTERNAL MEDICINE
Payer: MEDICARE

## 2017-02-01 ENCOUNTER — PRIOR ORIGINAL RECORDS (OUTPATIENT)
Dept: OTHER | Age: 82
End: 2017-02-01

## 2017-02-01 VITALS
OXYGEN SATURATION: 97 % | SYSTOLIC BLOOD PRESSURE: 142 MMHG | BODY MASS INDEX: 47 KG/M2 | RESPIRATION RATE: 18 BRPM | WEIGHT: 241.31 LBS | DIASTOLIC BLOOD PRESSURE: 75 MMHG | HEART RATE: 92 BPM

## 2017-02-01 DIAGNOSIS — I31.3 PERICARDIAL EFFUSION: ICD-10-CM

## 2017-02-01 DIAGNOSIS — J44.1 COPD EXACERBATION (HCC): ICD-10-CM

## 2017-02-01 DIAGNOSIS — J12.9 VIRAL PNEUMONIA, UNSPECIFIED: ICD-10-CM

## 2017-02-01 DIAGNOSIS — R73.9 HYPERGLYCEMIA: ICD-10-CM

## 2017-02-01 DIAGNOSIS — E07.9 THYROID DISEASE: Chronic | ICD-10-CM

## 2017-02-01 DIAGNOSIS — I50.9 ACUTE ON CHRONIC CONGESTIVE HEART FAILURE, UNSPECIFIED CONGESTIVE HEART FAILURE TYPE: ICD-10-CM

## 2017-02-01 DIAGNOSIS — E87.5 HYPERKALEMIA: ICD-10-CM

## 2017-02-01 DIAGNOSIS — G47.33 OSA TREATED WITH BIPAP: ICD-10-CM

## 2017-02-01 DIAGNOSIS — I48.19 PERSISTENT ATRIAL FIBRILLATION (HCC): ICD-10-CM

## 2017-02-01 DIAGNOSIS — R77.8 ELEVATED TROPONIN: ICD-10-CM

## 2017-02-01 DIAGNOSIS — I48.91 ATRIAL FIBRILLATION (HCC): Primary | ICD-10-CM

## 2017-02-01 DIAGNOSIS — I48.19 ATRIAL FIBRILLATION, PERSISTENT (HCC): Chronic | ICD-10-CM

## 2017-02-01 PROBLEM — I31.39 PERICARDIAL EFFUSION: Status: ACTIVE | Noted: 2017-02-01

## 2017-02-01 LAB
ANION GAP SERPL CALC-SCNC: 7 MMOL/L (ref 0–18)
BUN SERPL-MCNC: 27 MG/DL (ref 8–20)
BUN/CREAT SERPL: 33.8 (ref 10–20)
CALCIUM SERPL-MCNC: 9.1 MG/DL (ref 8.5–10.5)
CHLORIDE SERPL-SCNC: 100 MMOL/L (ref 95–110)
CO2 SERPL-SCNC: 34 MMOL/L (ref 22–32)
CREAT SERPL-MCNC: 0.8 MG/DL (ref 0.5–1.5)
GLUCOSE SERPL-MCNC: 145 MG/DL (ref 70–99)
OSMOLALITY UR CALC.SUM OF ELEC: 300 MOSM/KG (ref 275–295)
POTASSIUM SERPL-SCNC: 5.3 MMOL/L (ref 3.3–5.1)
SODIUM SERPL-SCNC: 141 MMOL/L (ref 136–144)

## 2017-02-01 PROCEDURE — 36415 COLL VENOUS BLD VENIPUNCTURE: CPT | Performed by: NURSE PRACTITIONER

## 2017-02-01 PROCEDURE — 99214 OFFICE O/P EST MOD 30 MIN: CPT | Performed by: NURSE PRACTITIONER

## 2017-02-01 PROCEDURE — 80048 BASIC METABOLIC PNL TOTAL CA: CPT | Performed by: NURSE PRACTITIONER

## 2017-02-01 PROCEDURE — 93005 ELECTROCARDIOGRAM TRACING: CPT

## 2017-02-01 PROCEDURE — 93010 ELECTROCARDIOGRAM REPORT: CPT | Performed by: NURSE PRACTITIONER

## 2017-02-01 PROCEDURE — 99211 OFF/OP EST MAY X REQ PHY/QHP: CPT | Performed by: NURSE PRACTITIONER

## 2017-02-01 RX ORDER — IPRATROPIUM BROMIDE AND ALBUTEROL SULFATE 2.5; .5 MG/3ML; MG/3ML
3 SOLUTION RESPIRATORY (INHALATION) 3 TIMES DAILY
Qty: 90 VIAL | Refills: 0 | Status: SHIPPED | OUTPATIENT
Start: 2017-02-01 | End: 2017-03-03

## 2017-02-01 NOTE — PROGRESS NOTES
[address] Patient Status:  Outpatient    1932 MRN [id number]   Location [address] [facility] No primary care provider on file.   Niall Juarez MD       Uzair Hawk is a 80year old female who presents hemoptysis and wheezing  Cardiovascular: positive for fatigue, negative for chest pain, exertional chest pressure/discomfort, near-syncope, orthopnea and palpitations  Gastrointestinal: negative for abdominal pain, diarrhea, melena, nausea and vomiting  He Oral Tab, Take 2 tablets daily for 4 days, then take 1 tablet daily for 4 days, then stop., Disp: 12 tablet, Rfl: 0  •  benzonatate 100 MG Oral Cap, Take 1 capsule (100 mg total) by mouth 3 (three) times daily. , Disp: 30 capsule, Rfl: 0  •  Rivaroxaban 20 foods, fluid restrictions, daily weights, medication regimen s/s HF, Pneumonia, copd and afib exacerbation and when to call APN/clinic. Patient and family receptive.     Assessment:  Bilateral pneumonia with coronavirus and acute exacerbation of copd with a still wheezing but she did not take increased prednisone as directed, diuresing with daily lasix. Renal function is stable , K 5.3 on Kdur, will DC and repeat bmp on 2-7-17. Repeat echo in 2-3 weeks to re-evaluate pericardial effusion ( order given) .  Cont remember to read nutrition labels for sodium content. · Exercise daily as tolerated, with goal of doing moderate aerobic exercise like walking for about 30 minutes 5 days per week.  Start by walking at a slow to moderate pace for 5-10 minutes 2-3 times

## 2017-02-01 NOTE — PATIENT INSTRUCTIONS
Continue all your same medications except change simvastatin to 20 mg - take half a tablet daily     Stop taking potassium chloride    Begin taking prednisone 20 mg - take 2 tablets daily for 4 days then decrease to one tablet daily, check with your Rheuma pain, lightheadedness, or significant shortness of breath

## 2017-02-06 ENCOUNTER — OFFICE VISIT (OUTPATIENT)
Dept: INTERNAL MEDICINE CLINIC | Facility: CLINIC | Age: 82
End: 2017-02-06

## 2017-02-06 ENCOUNTER — APPOINTMENT (OUTPATIENT)
Dept: LAB | Age: 82
End: 2017-02-06
Attending: INTERNAL MEDICINE
Payer: MEDICARE

## 2017-02-06 VITALS
SYSTOLIC BLOOD PRESSURE: 117 MMHG | DIASTOLIC BLOOD PRESSURE: 74 MMHG | HEART RATE: 88 BPM | BODY MASS INDEX: 47 KG/M2 | TEMPERATURE: 99 F | WEIGHT: 241 LBS

## 2017-02-06 DIAGNOSIS — G47.33 OSA TREATED WITH BIPAP: ICD-10-CM

## 2017-02-06 DIAGNOSIS — R93.0 ABNORMAL MRI SCAN, HEAD: ICD-10-CM

## 2017-02-06 DIAGNOSIS — E07.9 THYROID DISEASE: Chronic | ICD-10-CM

## 2017-02-06 DIAGNOSIS — I48.20 CHRONIC ATRIAL FIBRILLATION (HCC): ICD-10-CM

## 2017-02-06 DIAGNOSIS — E87.5 HYPERKALEMIA: Primary | ICD-10-CM

## 2017-02-06 DIAGNOSIS — E87.5 HYPERKALEMIA: ICD-10-CM

## 2017-02-06 DIAGNOSIS — I50.9 ACUTE ON CHRONIC CONGESTIVE HEART FAILURE, UNSPECIFIED CONGESTIVE HEART FAILURE TYPE: ICD-10-CM

## 2017-02-06 LAB
ANION GAP SERPL CALC-SCNC: 9 MMOL/L (ref 0–18)
BUN SERPL-MCNC: 19 MG/DL (ref 8–20)
BUN/CREAT SERPL: 22.1 (ref 10–20)
CALCIUM SERPL-MCNC: 8.9 MG/DL (ref 8.5–10.5)
CHLORIDE SERPL-SCNC: 99 MMOL/L (ref 95–110)
CO2 SERPL-SCNC: 30 MMOL/L (ref 22–32)
CREAT SERPL-MCNC: 0.86 MG/DL (ref 0.5–1.5)
GLUCOSE SERPL-MCNC: 139 MG/DL (ref 70–99)
OSMOLALITY UR CALC.SUM OF ELEC: 291 MOSM/KG (ref 275–295)
POTASSIUM SERPL-SCNC: 3.5 MMOL/L (ref 3.3–5.1)
SODIUM SERPL-SCNC: 138 MMOL/L (ref 136–144)

## 2017-02-06 PROCEDURE — 99214 OFFICE O/P EST MOD 30 MIN: CPT | Performed by: INTERNAL MEDICINE

## 2017-02-06 PROCEDURE — G0463 HOSPITAL OUTPT CLINIC VISIT: HCPCS | Performed by: INTERNAL MEDICINE

## 2017-02-06 PROCEDURE — 36415 COLL VENOUS BLD VENIPUNCTURE: CPT

## 2017-02-06 PROCEDURE — 80048 BASIC METABOLIC PNL TOTAL CA: CPT

## 2017-02-06 RX ORDER — SIMVASTATIN 5 MG
5 TABLET ORAL NIGHTLY
Qty: 90 TABLET | Refills: 3 | Status: SHIPPED | OUTPATIENT
Start: 2017-02-06 | End: 2017-03-07 | Stop reason: ALTCHOICE

## 2017-02-06 NOTE — PROGRESS NOTES
HPI:    Patient ID: Shaquille Gorman is a 80year old female. Snoring  This is a chronic problem. The current episode started more than 1 year ago. The problem occurs daily. The problem has been unchanged.  Pertinent negatives include no chest pain, chills file.     Smoking Status: Never Smoker                      Alcohol Use: No                        Current Outpatient Prescriptions:  ipratropium-albuterol (DUONEB) 0.5-2.5 (3) MG/3ML Inhalation Solution Take 3 mL by nebulization 3 (three) times daily.  Dis Disp:  Rfl:      Allergies:  Penicillins                PHYSICAL EXAM:   Physical Exam   Constitutional: She appears well-developed and well-nourished. No distress. HENT:   Head: Normocephalic and atraumatic.    Eyes: EOM are normal.   Neck: Normal range encounter    Imaging & Referrals:  None       #[id number]    Attention:  This note has been scribed by Martine Romero(scribe) under the supervision of Dr. Jennifer Doan MD on 02/06/2017 at 11:14 Magdalene Bence, M.D., have reviewed and agree with this docu

## 2017-02-07 ENCOUNTER — TELEPHONE (OUTPATIENT)
Dept: INTERNAL MEDICINE CLINIC | Facility: CLINIC | Age: 82
End: 2017-02-07

## 2017-02-07 DIAGNOSIS — E87.6 LOW SERUM POTASSIUM LEVEL: Primary | ICD-10-CM

## 2017-02-07 RX ORDER — POTASSIUM CHLORIDE 1500 MG/1
20 TABLET, FILM COATED, EXTENDED RELEASE ORAL DAILY
Qty: 30 TABLET | Refills: 1 | Status: SHIPPED | OUTPATIENT
Start: 2017-02-07 | End: 2017-04-20

## 2017-02-07 NOTE — TELEPHONE ENCOUNTER
Advised daughter of Dr. Bertrand Pickering note. Daughter verbalized understanding. Daughter would like script sent to pharmacy. Please sign off on Rx if ok.

## 2017-02-07 NOTE — TELEPHONE ENCOUNTER
----- Message from Wilfrido Perkins MD sent at 2/7/2017 10:25 AM CST -----  The potassium level is very low end of normal.  Suggest restarting potassium 1 pill daily. Recheck potassium in 3 weeks.

## 2017-02-13 ENCOUNTER — HOSPITAL ENCOUNTER (OUTPATIENT)
Dept: CV DIAGNOSTICS | Facility: HOSPITAL | Age: 82
Discharge: HOME | End: 2017-02-13
Attending: NURSE PRACTITIONER
Payer: MEDICARE

## 2017-02-13 DIAGNOSIS — I48.91 ATRIAL FIBRILLATION (HCC): ICD-10-CM

## 2017-02-13 DIAGNOSIS — I31.3 PERICARDIAL EFFUSION: ICD-10-CM

## 2017-02-13 PROCEDURE — 93306 TTE W/DOPPLER COMPLETE: CPT | Performed by: INTERNAL MEDICINE

## 2017-02-13 PROCEDURE — 93306 TTE W/DOPPLER COMPLETE: CPT

## 2017-02-15 ENCOUNTER — PRIOR ORIGINAL RECORDS (OUTPATIENT)
Dept: OTHER | Age: 82
End: 2017-02-15

## 2017-02-15 ENCOUNTER — OFFICE VISIT (OUTPATIENT)
Dept: CARDIOLOGY CLINIC | Facility: HOSPITAL | Age: 82
End: 2017-02-15
Attending: INTERNAL MEDICINE
Payer: MEDICARE

## 2017-02-15 VITALS
HEART RATE: 80 BPM | DIASTOLIC BLOOD PRESSURE: 80 MMHG | BODY MASS INDEX: 48 KG/M2 | WEIGHT: 245.81 LBS | SYSTOLIC BLOOD PRESSURE: 161 MMHG | RESPIRATION RATE: 18 BRPM | OXYGEN SATURATION: 97 %

## 2017-02-15 DIAGNOSIS — E07.9 THYROID DISEASE: ICD-10-CM

## 2017-02-15 DIAGNOSIS — I31.3 PERICARDIAL EFFUSION: ICD-10-CM

## 2017-02-15 DIAGNOSIS — I50.9 ACUTE ON CHRONIC CONGESTIVE HEART FAILURE, UNSPECIFIED CONGESTIVE HEART FAILURE TYPE: ICD-10-CM

## 2017-02-15 DIAGNOSIS — J12.9 VIRAL PNEUMONIA, UNSPECIFIED: ICD-10-CM

## 2017-02-15 DIAGNOSIS — G47.33 OSA TREATED WITH BIPAP: ICD-10-CM

## 2017-02-15 DIAGNOSIS — I48.19 ATRIAL FIBRILLATION, PERSISTENT (HCC): ICD-10-CM

## 2017-02-15 DIAGNOSIS — J44.1 COPD EXACERBATION (HCC): ICD-10-CM

## 2017-02-15 DIAGNOSIS — I50.9 HEART FAILURE, UNSPECIFIED (HCC): Primary | ICD-10-CM

## 2017-02-15 LAB
ANION GAP SERPL CALC-SCNC: 9 MMOL/L (ref 0–18)
BUN SERPL-MCNC: 15 MG/DL (ref 8–20)
BUN/CREAT SERPL: 21.4 (ref 10–20)
CALCIUM SERPL-MCNC: 9.2 MG/DL (ref 8.5–10.5)
CHLORIDE SERPL-SCNC: 99 MMOL/L (ref 95–110)
CO2 SERPL-SCNC: 30 MMOL/L (ref 22–32)
CREAT SERPL-MCNC: 0.7 MG/DL (ref 0.5–1.5)
GLUCOSE SERPL-MCNC: 101 MG/DL (ref 70–99)
OSMOLALITY UR CALC.SUM OF ELEC: 287 MOSM/KG (ref 275–295)
POTASSIUM SERPL-SCNC: 3.5 MMOL/L (ref 3.3–5.1)
SODIUM SERPL-SCNC: 138 MMOL/L (ref 136–144)

## 2017-02-15 PROCEDURE — 36415 COLL VENOUS BLD VENIPUNCTURE: CPT | Performed by: NURSE PRACTITIONER

## 2017-02-15 PROCEDURE — 80048 BASIC METABOLIC PNL TOTAL CA: CPT | Performed by: NURSE PRACTITIONER

## 2017-02-15 PROCEDURE — 99214 OFFICE O/P EST MOD 30 MIN: CPT | Performed by: NURSE PRACTITIONER

## 2017-02-15 PROCEDURE — 99211 OFF/OP EST MAY X REQ PHY/QHP: CPT | Performed by: NURSE PRACTITIONER

## 2017-02-15 RX ORDER — PREDNISONE 20 MG/1
20 TABLET ORAL DAILY
Qty: 12 TABLET | Refills: 0 | Status: SHIPPED | OUTPATIENT
Start: 2017-02-15 | End: 2018-01-01

## 2017-02-15 NOTE — PATIENT INSTRUCTIONS
Continue all your same medications including potassium chloride 20 meq one tablet daily     Blood test in 2 weeks as recommended by Dr. Tania Lutz    Call if having any dizziness, lightheadedness, heart racing, palpitations, chest pain, shortness of breath, coug

## 2017-02-15 NOTE — PROGRESS NOTES
[address] Patient Status:  Outpatient    1932 MRN [id number]   Location [address] [place] [facility] John Soria MD       Alissa Ludwig is a 80year old female who presents to clinic for for chest pain, exertional chest pressure/discomfort, near-syncope, orthopnea and palpitations  Gastrointestinal: negative for abdominal pain, diarrhea, melena, nausea and vomiting  Hematologic/lymphatic: negative  Musculoskeletal:positive for arthralgias, nebulization 3 (three) times daily. , Disp: 90 vial, Rfl: 0  •  DiltiaZEM HCl ER Coated Beads 120 MG Oral Capsule SR 24 Hr, Take 1 capsule (120 mg total) by mouth daily. , Disp: 30 capsule, Rfl: 1  •  Rivaroxaban 20 MG Oral Tab, Take 1 tablet (20 mg total) b diet, low sodium foods, fluid restrictions, daily weights, medication regimen s/s HF, Pneumonia, copd and afib exacerbation and when to call APN/clinic. Patient and family receptive.     Assessment:  Bilateral pneumonia with coronavirus and acute exacerbati morning before breakfast, call if gaining 3 lbs or more overnight or more than 5 lbs in one week. Follow up with Afib/heart failure/COPD/Pneumonia clinic as needed or as directed.     Follow up with Dr. David Kee in 2-3weeks, call to make an appointment [phone]

## 2017-02-22 NOTE — TELEPHONE ENCOUNTER
Pt daughter calling to request refill for HYDROcodone-acetaminophen 5-325 MG Oral Tab.   Please advise when script is ready      Current Outpatient Prescriptions:  HYDROcodone-acetaminophen 5-325 MG Oral Tab Take 1 tablet by mouth every 6 (six) hours as nicki

## 2017-02-23 ENCOUNTER — TELEPHONE (OUTPATIENT)
Dept: INTERNAL MEDICINE CLINIC | Facility: CLINIC | Age: 82
End: 2017-02-23

## 2017-02-23 RX ORDER — HYDROCODONE BITARTRATE AND ACETAMINOPHEN 5; 325 MG/1; MG/1
1 TABLET ORAL EVERY 6 HOURS PRN
Qty: 100 TABLET | Refills: 0 | Status: SHIPPED | OUTPATIENT
Start: 2017-02-23 | End: 2017-03-28

## 2017-02-23 RX ORDER — HYDROCODONE BITARTRATE AND ACETAMINOPHEN 5; 325 MG/1; MG/1
1 TABLET ORAL EVERY 6 HOURS PRN
Qty: 100 TABLET | Refills: 0 | Status: SHIPPED | OUTPATIENT
Start: 2017-02-23 | End: 2017-03-07

## 2017-02-23 NOTE — TELEPHONE ENCOUNTER
Pt's daughter stts pt needs a refill on Hydrocodone for chronic knee pain. Please have roomer call  pt when script printed.   Refill Protocol Appointment Criteria  · Appointment scheduled in the past 6 months or in the next 3 months  Recent Visits       Pro

## 2017-02-23 NOTE — TELEPHONE ENCOUNTER
Please advise on refill request.     Recent Visits       Provider Department Primary Dx    2 weeks ago Raúl Mari MD [facility], [facility], [address], Haakon Hyperkalemia        Future Appointments       Provider Department Appt Notes    In 2 months Kem Romero

## 2017-02-24 NOTE — TELEPHONE ENCOUNTER
Pt 's daughter notified Rx Hydrocodone-Acetaminophen 5-325 mg is ready for . Rx is in pod B behind the .

## 2017-03-01 RX ORDER — HYDROCODONE BITARTRATE AND ACETAMINOPHEN 5; 325 MG/1; MG/1
1 TABLET ORAL EVERY 6 HOURS PRN
Qty: 100 TABLET | Refills: 0 | OUTPATIENT
Start: 2017-03-01

## 2017-03-06 ENCOUNTER — TELEPHONE (OUTPATIENT)
Dept: INTERNAL MEDICINE CLINIC | Facility: CLINIC | Age: 82
End: 2017-03-06

## 2017-03-07 ENCOUNTER — OFFICE VISIT (OUTPATIENT)
Dept: INTERNAL MEDICINE CLINIC | Facility: CLINIC | Age: 82
End: 2017-03-07

## 2017-03-07 ENCOUNTER — TELEPHONE (OUTPATIENT)
Dept: INTERNAL MEDICINE CLINIC | Facility: CLINIC | Age: 82
End: 2017-03-07

## 2017-03-07 VITALS
HEART RATE: 93 BPM | SYSTOLIC BLOOD PRESSURE: 119 MMHG | WEIGHT: 243 LBS | HEIGHT: 61 IN | DIASTOLIC BLOOD PRESSURE: 79 MMHG | TEMPERATURE: 98 F | BODY MASS INDEX: 45.88 KG/M2 | OXYGEN SATURATION: 94 %

## 2017-03-07 DIAGNOSIS — R06.02 SOB (SHORTNESS OF BREATH): ICD-10-CM

## 2017-03-07 DIAGNOSIS — R06.2 WHEEZING: ICD-10-CM

## 2017-03-07 DIAGNOSIS — R68.89 FLU-LIKE SYMPTOMS: ICD-10-CM

## 2017-03-07 DIAGNOSIS — J18.9 WALKING PNEUMONIA: Primary | ICD-10-CM

## 2017-03-07 PROCEDURE — 99214 OFFICE O/P EST MOD 30 MIN: CPT | Performed by: INTERNAL MEDICINE

## 2017-03-07 PROCEDURE — G0463 HOSPITAL OUTPT CLINIC VISIT: HCPCS | Performed by: INTERNAL MEDICINE

## 2017-03-07 RX ORDER — NYSTATIN 100000 [USP'U]/G
POWDER TOPICAL
Refills: 0 | COMMUNITY
Start: 2017-03-04 | End: 2017-06-16

## 2017-03-07 RX ORDER — ERGOCALCIFEROL 1.25 MG/1
CAPSULE ORAL
Refills: 0 | COMMUNITY
Start: 2017-01-10 | End: 2017-03-28

## 2017-03-07 RX ORDER — BENZONATATE 100 MG/1
100 CAPSULE ORAL 3 TIMES DAILY PRN
Qty: 20 CAPSULE | Refills: 0 | Status: SHIPPED | OUTPATIENT
Start: 2017-03-07 | End: 2017-03-14

## 2017-03-07 RX ORDER — METHYLPREDNISOLONE 4 MG/1
TABLET ORAL
Qty: 1 KIT | Refills: 0 | Status: SHIPPED | OUTPATIENT
Start: 2017-03-07 | End: 2017-01-01 | Stop reason: ALTCHOICE

## 2017-03-07 RX ORDER — DOXYCYCLINE HYCLATE 100 MG/1
100 CAPSULE ORAL 2 TIMES DAILY
Qty: 20 CAPSULE | Refills: 0 | Status: SHIPPED | OUTPATIENT
Start: 2017-03-07 | End: 2017-03-17

## 2017-03-07 NOTE — TELEPHONE ENCOUNTER
Reason for Call/Chief Complaint:    cough  Onset:   Since 1/26/17  Nursing Assessment/Associated Symptoms:    Patient's daughter stated since the pneumonia on 1/26/17 has not stopped coughing since. Daughter stated the coughing is getting worse.   Coughin

## 2017-03-07 NOTE — TELEPHONE ENCOUNTER
Daughter calling regarding pt having a cough that coming back. Daughter is concerned that pt pnuemonia has not fully cleared . Abel El please advise

## 2017-03-07 NOTE — PATIENT INSTRUCTIONS
ASSESSMENT/PLAN:   Walking pneumonia  (primary encounter diagnosis)- you have infection and will need ab, take as prescribed with food, let us know if not better or go to ER if worst  Wheezing- due to above.  Will ad steroids and also will treat with breath

## 2017-03-07 NOTE — TELEPHONE ENCOUNTER
[facility] pharmacy faxed a request for Metoprolol it is in the historical list. Please check to see if this medication need to be ordered.

## 2017-03-07 NOTE — PROGRESS NOTES
HPI:    Patient ID: Uzair Hawk is a 80year old female. HPI  Pt comes in with complaint of cough, sob, wheezing fatigue, no fever. Pt was recently at [facility] with  pneumonia and new onset A fib.      Review of Systems   Constitutional: Positive for fatigu daily. Disp:  Rfl:    simvastatin 5 MG Oral Tab Take 20 mg by mouth nightly. Disp:  Rfl:    gabapentin 100 MG Oral Cap Take 100 mg by mouth nightly. Disp:  Rfl:    Meclizine HCl 12.5 MG Oral Tab Take 12.5 mg by mouth 3 (three) times daily as needed.  Disp: time. She has normal reflexes. Skin: Skin is warm and dry. Psychiatric: She has a normal mood and affect. Her behavior is normal. Judgment and thought content normal.   Nursing note and vitals reviewed.            ASSESSMENT/PLAN:   Walking pneumonia  (

## 2017-03-09 ENCOUNTER — PRIOR ORIGINAL RECORDS (OUTPATIENT)
Dept: OTHER | Age: 82
End: 2017-03-09

## 2017-03-09 LAB
FLUAV + FLUBV RNA SPEC NAA+PROBE: NEGATIVE

## 2017-03-14 ENCOUNTER — TELEPHONE (OUTPATIENT)
Dept: INTERNAL MEDICINE CLINIC | Facility: CLINIC | Age: 82
End: 2017-03-14

## 2017-03-15 ENCOUNTER — TELEPHONE (OUTPATIENT)
Dept: INTERNAL MEDICINE CLINIC | Facility: CLINIC | Age: 82
End: 2017-03-15

## 2017-03-15 RX ORDER — PROMETHAZINE HYDROCHLORIDE 6.25 MG/5ML
6.25 SYRUP ORAL 2 TIMES DAILY
Qty: 240 ML | Refills: 0 | Status: SHIPPED
Start: 2017-03-15 | End: 2017-03-20

## 2017-03-15 RX ORDER — BENZONATATE 100 MG/1
100 CAPSULE ORAL 3 TIMES DAILY PRN
Qty: 20 CAPSULE | Refills: 0 | Status: SHIPPED | OUTPATIENT
Start: 2017-03-15 | End: 2017-03-23

## 2017-03-15 NOTE — TELEPHONE ENCOUNTER
Attempted to contact patient, answer from Jessica Bridegroom daughter did inform there is no consent on file per daughter they recently sign at most recent visit. Did relayed daughter Dr. Adrian Todd message as below.  Daughter states will have Mariangel finish AB however will lik

## 2017-03-15 NOTE — TELEPHONE ENCOUNTER
Pt's daughter called to state that she wanted another rx for the tessalon perls which she states is helping her mom and does not want anything really strong for her. Pt's daughter apologizes for the mis-communication.

## 2017-03-15 NOTE — TELEPHONE ENCOUNTER
I called no answer, the cought might linger a little longer  But as long as she feels better , less sob and improving over all, to finish the ab and also we can send another cough medication if need

## 2017-03-23 RX ORDER — BENZONATATE 100 MG/1
CAPSULE ORAL
Qty: 20 CAPSULE | Refills: 0 | Status: SHIPPED | OUTPATIENT
Start: 2017-03-23 | End: 2017-05-08 | Stop reason: ALTCHOICE

## 2017-03-28 RX ORDER — FUROSEMIDE 40 MG/1
40 TABLET ORAL DAILY
Qty: 90 TABLET | Refills: 3 | Status: SHIPPED | OUTPATIENT
Start: 2017-03-28 | End: 2017-09-25

## 2017-03-28 RX ORDER — HYDROCODONE BITARTRATE AND ACETAMINOPHEN 5; 325 MG/1; MG/1
1 TABLET ORAL EVERY 6 HOURS PRN
Qty: 100 TABLET | Refills: 0 | OUTPATIENT
Start: 2017-03-28

## 2017-03-28 RX ORDER — ERGOCALCIFEROL 1.25 MG/1
CAPSULE ORAL
Qty: 6 CAPSULE | Refills: 0 | Status: SHIPPED | OUTPATIENT
Start: 2017-03-28 | End: 2017-04-20

## 2017-03-28 RX ORDER — DILTIAZEM HYDROCHLORIDE 120 MG/1
120 CAPSULE, COATED, EXTENDED RELEASE ORAL DAILY
Qty: 30 CAPSULE | Refills: 1 | Status: SHIPPED | OUTPATIENT
Start: 2017-03-28 | End: 2017-04-20

## 2017-03-28 NOTE — TELEPHONE ENCOUNTER
Please advise on refill request.     Recent Visits       Provider Department Primary Dx    3 weeks ago Elza Cardozo MD [facility], [facility], [address], Nina Walking pneumonia    1 month ago Kinsey Escoto MD [facility], [facility], [address], Anotnio Tao

## 2017-03-28 NOTE — TELEPHONE ENCOUNTER
[facility] mailorder faxed a request for the following medications. Ditiazem Hcl ER, Furosemide, Metoprolol, and Ergocaliferol, 50,000. Please check the profile list the vitamin D was only for 2 weeks.  Fwd to Dr. Fito Veliz

## 2017-03-29 RX ORDER — HYDROCODONE BITARTRATE AND ACETAMINOPHEN 5; 325 MG/1; MG/1
1 TABLET ORAL EVERY 6 HOURS PRN
Qty: 100 TABLET | Refills: 0 | Status: SHIPPED | OUTPATIENT
Start: 2017-03-29 | End: 2017-05-08

## 2017-03-29 NOTE — TELEPHONE ENCOUNTER
Phone call to patient's home. S/W with daughter,(RIA OK's) and advised script for Hydrocodone ready for p/u at Reception B. Daughter states she will.  Script placed in black file baxter at reception B.

## 2017-03-29 NOTE — TELEPHONE ENCOUNTER
Refused Medications  HYDROcodone-acetaminophen 5-325 MG Oral Tab  Take 1 tablet by mouth every 6 (six) hours as needed for Pain. Disp: 100 tablet Refills: 0    Class: Script printed Start: 3/28/2017   Refused by: Jim Cheng MD  Refusal reason:  Alissa Torres

## 2017-03-29 NOTE — TELEPHONE ENCOUNTER
Pt calling back to f/u on script. Pt will need to receive this today please. Pt cannot go another day with out her medication.

## 2017-03-29 NOTE — TELEPHONE ENCOUNTER
Patient advised of Dr Avelar Ran note below. Patient stated that has knee pain and the hydrocodone is the only thing that helps with the pain. Patient already has an appointment with Dr Reji Elkins on 5/8/17. Please advise.

## 2017-04-18 RX ORDER — DILTIAZEM HYDROCHLORIDE 120 MG/1
CAPSULE, COATED, EXTENDED RELEASE ORAL
Qty: 60 CAPSULE | Refills: 0 | Status: SHIPPED | OUTPATIENT
Start: 2017-04-18 | End: 2017-05-08

## 2017-04-18 RX ORDER — POTASSIUM CHLORIDE 20 MEQ/1
TABLET, EXTENDED RELEASE ORAL
Qty: 90 TABLET | Refills: 0 | Status: SHIPPED | OUTPATIENT
Start: 2017-04-18 | End: 2017-05-08

## 2017-04-18 RX ORDER — ERGOCALCIFEROL 1.25 MG/1
CAPSULE ORAL
Qty: 6 CAPSULE | Refills: 0 | Status: SHIPPED | OUTPATIENT
Start: 2017-04-18 | End: 2017-05-08

## 2017-04-18 RX ORDER — POTASSIUM CHLORIDE 20 MEQ/1
TABLET, EXTENDED RELEASE ORAL
Qty: 90 TABLET | Refills: 0 | Status: SHIPPED | OUTPATIENT
Start: 2017-04-18 | End: 2017-04-20

## 2017-04-21 RX ORDER — ERGOCALCIFEROL 1.25 MG/1
CAPSULE ORAL
Qty: 6 CAPSULE | Refills: 0 | Status: SHIPPED | OUTPATIENT
Start: 2017-04-21 | End: 2018-01-01 | Stop reason: ALTCHOICE

## 2017-04-21 RX ORDER — POTASSIUM CHLORIDE 20 MEQ/1
TABLET, EXTENDED RELEASE ORAL
Qty: 90 TABLET | Refills: 0 | Status: SHIPPED | OUTPATIENT
Start: 2017-04-21 | End: 2017-10-26

## 2017-04-21 RX ORDER — DILTIAZEM HYDROCHLORIDE 120 MG/1
CAPSULE, COATED, EXTENDED RELEASE ORAL
Qty: 60 CAPSULE | Refills: 0 | Status: SHIPPED | OUTPATIENT
Start: 2017-04-21 | End: 2017-08-04

## 2017-04-21 RX ORDER — POTASSIUM CHLORIDE 20 MEQ/1
TABLET, EXTENDED RELEASE ORAL
Qty: 90 TABLET | Refills: 0 | Status: SHIPPED | OUTPATIENT
Start: 2017-04-21 | End: 2017-05-08

## 2017-05-08 ENCOUNTER — OFFICE VISIT (OUTPATIENT)
Dept: INTERNAL MEDICINE CLINIC | Facility: CLINIC | Age: 82
End: 2017-05-08

## 2017-05-08 VITALS
WEIGHT: 244.88 LBS | BODY MASS INDEX: 46 KG/M2 | HEART RATE: 84 BPM | SYSTOLIC BLOOD PRESSURE: 116 MMHG | DIASTOLIC BLOOD PRESSURE: 73 MMHG

## 2017-05-08 DIAGNOSIS — I48.19 ATRIAL FIBRILLATION, PERSISTENT (HCC): Primary | Chronic | ICD-10-CM

## 2017-05-08 DIAGNOSIS — I10 ESSENTIAL HYPERTENSION: ICD-10-CM

## 2017-05-08 PROCEDURE — 99214 OFFICE O/P EST MOD 30 MIN: CPT | Performed by: INTERNAL MEDICINE

## 2017-05-08 PROCEDURE — G0463 HOSPITAL OUTPT CLINIC VISIT: HCPCS | Performed by: INTERNAL MEDICINE

## 2017-05-08 RX ORDER — HYDROCODONE BITARTRATE AND ACETAMINOPHEN 5; 325 MG/1; MG/1
1 TABLET ORAL EVERY 6 HOURS PRN
Qty: 100 TABLET | Refills: 0 | Status: SHIPPED | OUTPATIENT
Start: 2017-05-08 | End: 2017-06-05

## 2017-05-08 NOTE — PROGRESS NOTES
HPI:    Patient ID: Brooks Hart is a 80year old female. Hypertension  This is a chronic problem. The current episode started more than 1 year ago. The problem has been resolved (with medication) since onset. The problem is controlled.  Pertinent negat POTASSIUM CHLORIDE ER 20 MEQ Oral Tab CR TAKE 1 TABLET BY MOUTH DAILY Disp: 90 tablet Rfl: 0   HYDROcodone-acetaminophen 5-325 MG Oral Tab Take 1 tablet by mouth every 6 (six) hours as needed for Pain.  Disp: 100 tablet Rfl: 0   furosemide 40 MG Oral Tab normal and breath sounds normal. No respiratory distress. She has no wheezes. She has no rales. She exhibits no tenderness. Musculoskeletal: Normal range of motion. Neurological: She is alert. Skin: Skin is dry.    Psychiatric: Her behavior is normal. the chart and discharge instructions (if applicable) and agree that the record reflects my personal performance and is accurate and complete.   Kalpesh Sapp MD, 5/8/2017, 12:51 PM

## 2017-05-18 ENCOUNTER — TELEPHONE (OUTPATIENT)
Dept: INTERNAL MEDICINE CLINIC | Facility: CLINIC | Age: 82
End: 2017-05-18

## 2017-05-18 NOTE — TELEPHONE ENCOUNTER
Was seen  May 8th and this medication Omeprazole, 40 mg  was not sent to her mail order.    Daughter asking if we can send #90  And a refill to:      [address] [id number], [address] [facility], [phone],

## 2017-05-20 RX ORDER — OMEPRAZOLE 40 MG/1
40 CAPSULE, DELAYED RELEASE ORAL DAILY
Qty: 90 CAPSULE | Refills: 1 | Status: SHIPPED | OUTPATIENT
Start: 2017-05-20 | End: 2017-08-10

## 2017-05-20 NOTE — TELEPHONE ENCOUNTER
Refill Protocol Appointment Criteria  · Appointment scheduled in the past 12 months or in the next 3 months  Recent Visits       Provider Department Primary Dx    1 week ago Denise Ross MD [facility], [employer], [address] Atrial fibrillation, pers

## 2017-05-31 ENCOUNTER — PRIOR ORIGINAL RECORDS (OUTPATIENT)
Dept: OTHER | Age: 82
End: 2017-05-31

## 2017-06-01 ENCOUNTER — TELEPHONE (OUTPATIENT)
Dept: INTERNAL MEDICINE CLINIC | Facility: CLINIC | Age: 82
End: 2017-06-01

## 2017-06-01 NOTE — TELEPHONE ENCOUNTER
Spoke to [facility], he will have the office fax an order form for the Dr to sign. Dr Emily Hurt, please have staff confirm receipt of paperwork and fax back to [phone] once it is signed.

## 2017-06-05 ENCOUNTER — OFFICE VISIT (OUTPATIENT)
Dept: INTERNAL MEDICINE CLINIC | Facility: CLINIC | Age: 82
End: 2017-06-05

## 2017-06-05 VITALS
HEART RATE: 83 BPM | WEIGHT: 238.81 LBS | TEMPERATURE: 98 F | DIASTOLIC BLOOD PRESSURE: 48 MMHG | BODY MASS INDEX: 45 KG/M2 | SYSTOLIC BLOOD PRESSURE: 87 MMHG

## 2017-06-05 DIAGNOSIS — J18.9 PNEUMONIA DUE TO INFECTIOUS ORGANISM, UNSPECIFIED LATERALITY, UNSPECIFIED PART OF LUNG: ICD-10-CM

## 2017-06-05 DIAGNOSIS — I48.19 ATRIAL FIBRILLATION, PERSISTENT (HCC): Primary | Chronic | ICD-10-CM

## 2017-06-05 DIAGNOSIS — I26.99 PE (PULMONARY THROMBOEMBOLISM) (HCC): ICD-10-CM

## 2017-06-05 PROCEDURE — 99214 OFFICE O/P EST MOD 30 MIN: CPT | Performed by: INTERNAL MEDICINE

## 2017-06-05 PROCEDURE — G0463 HOSPITAL OUTPT CLINIC VISIT: HCPCS | Performed by: INTERNAL MEDICINE

## 2017-06-05 RX ORDER — METHIMAZOLE 10 MG/1
10 TABLET ORAL DAILY
Refills: 0 | COMMUNITY
Start: 2017-05-29 | End: 2017-01-01

## 2017-06-05 RX ORDER — VALSARTAN 80 MG/1
40 TABLET ORAL DAILY
Refills: 0 | COMMUNITY
Start: 2017-05-28 | End: 2017-10-26

## 2017-06-05 RX ORDER — HYDROCODONE BITARTRATE AND ACETAMINOPHEN 5; 325 MG/1; MG/1
1 TABLET ORAL EVERY 6 HOURS PRN
Qty: 100 TABLET | Refills: 0 | Status: SHIPPED | OUTPATIENT
Start: 2017-06-05 | End: 2017-07-07

## 2017-06-05 NOTE — PROGRESS NOTES
HPI:    Patient ID: Carrie Rayo is a 80year old female. Pt hx given by daughter. Pt presents to clinic today for hospital f/u. Pt was at [facility] for A-fib, pleural effusion, and pneumonia a few days ago, and pt was coughing up blood.  P capsule Rfl: 1   ERGOCALCIFEROL 29010 units Oral Cap TAKE ONE CAPSULE BY MOUTH EVERY 2 WEEKS Disp: 6 capsule Rfl: 0   DILTIAZEM HCL ER COATED BEADS 120 MG Oral Capsule SR 24 Hr TAKE ONE CAPSULE BY MOUTH DAILY Disp: 60 capsule Rfl: 0   POTASSIUM CHLORIDE ER friction rub. No murmur heard. Pulmonary/Chest: Effort normal. No respiratory distress. She has no wheezes. She has rales (lower right region).    Growls/crackles on right side lower half   Musculoskeletal: She exhibits no edema (Negative for pedal deangelo scribe were at my direction and in my presence. I have reviewed the chart and discharge instructions (if applicable) and agree that the record reflects my personal performance and is accurate and complete.   Nat Curry MD, 6/5/2017, 5:02 PM

## 2017-06-06 ENCOUNTER — PRIOR ORIGINAL RECORDS (OUTPATIENT)
Dept: OTHER | Age: 82
End: 2017-06-06

## 2017-06-06 ENCOUNTER — MYAURORA ACCOUNT LINK (OUTPATIENT)
Dept: OTHER | Age: 82
End: 2017-06-06

## 2017-06-07 ENCOUNTER — LAB ENCOUNTER (OUTPATIENT)
Dept: LAB | Facility: HOSPITAL | Age: 82
End: 2017-06-07
Attending: INTERNAL MEDICINE
Payer: MEDICARE

## 2017-06-07 ENCOUNTER — HOSPITAL ENCOUNTER (OUTPATIENT)
Dept: GENERAL RADIOLOGY | Facility: HOSPITAL | Age: 82
Discharge: HOME | End: 2017-06-07
Attending: INTERNAL MEDICINE
Payer: MEDICARE

## 2017-06-07 ENCOUNTER — PRIOR ORIGINAL RECORDS (OUTPATIENT)
Dept: OTHER | Age: 82
End: 2017-06-07

## 2017-06-07 DIAGNOSIS — I50.33 ACUTE ON CHRONIC DIASTOLIC HEART FAILURE (HCC): Primary | ICD-10-CM

## 2017-06-07 DIAGNOSIS — I50.33 ACUTE ON CHRONIC DIASTOLIC HEART FAILURE (HCC): ICD-10-CM

## 2017-06-07 LAB
BUN: 15 MG/DL
CALCIUM: 92 MG/DL
CHLORIDE: 99 MEQ/L
CREATININE, SERUM: 0.7 MG/DL
GLUCOSE: 101 MG/DL
POTASSIUM, SERUM: 3.5 MEQ/L
SODIUM: 138 MEQ/L

## 2017-06-07 PROCEDURE — 71020 XR CHEST PA + LAT CHEST (CPT=71020): CPT | Performed by: INTERNAL MEDICINE

## 2017-06-07 PROCEDURE — 36415 COLL VENOUS BLD VENIPUNCTURE: CPT

## 2017-06-07 PROCEDURE — 83880 ASSAY OF NATRIURETIC PEPTIDE: CPT

## 2017-06-07 PROCEDURE — 80048 BASIC METABOLIC PNL TOTAL CA: CPT

## 2017-06-07 PROCEDURE — 85025 COMPLETE CBC W/AUTO DIFF WBC: CPT

## 2017-06-16 ENCOUNTER — LAB ENCOUNTER (OUTPATIENT)
Dept: LAB | Age: 82
End: 2017-06-16
Attending: INTERNAL MEDICINE
Payer: MEDICARE

## 2017-06-16 ENCOUNTER — OFFICE VISIT (OUTPATIENT)
Dept: INTERNAL MEDICINE CLINIC | Facility: CLINIC | Age: 82
End: 2017-06-16

## 2017-06-16 VITALS
SYSTOLIC BLOOD PRESSURE: 94 MMHG | BODY MASS INDEX: 46.33 KG/M2 | HEIGHT: 60 IN | WEIGHT: 236 LBS | HEART RATE: 91 BPM | DIASTOLIC BLOOD PRESSURE: 63 MMHG

## 2017-06-16 DIAGNOSIS — D64.9 ANEMIA, UNSPECIFIED TYPE: ICD-10-CM

## 2017-06-16 DIAGNOSIS — I50.9 CHRONIC CONGESTIVE HEART FAILURE, UNSPECIFIED CONGESTIVE HEART FAILURE TYPE: ICD-10-CM

## 2017-06-16 DIAGNOSIS — G47.33 OSA TREATED WITH BIPAP: ICD-10-CM

## 2017-06-16 DIAGNOSIS — I48.19 ATRIAL FIBRILLATION, PERSISTENT (HCC): Primary | Chronic | ICD-10-CM

## 2017-06-16 PROCEDURE — 99214 OFFICE O/P EST MOD 30 MIN: CPT | Performed by: INTERNAL MEDICINE

## 2017-06-16 PROCEDURE — 84466 ASSAY OF TRANSFERRIN: CPT

## 2017-06-16 PROCEDURE — 80048 BASIC METABOLIC PNL TOTAL CA: CPT

## 2017-06-16 PROCEDURE — G0463 HOSPITAL OUTPT CLINIC VISIT: HCPCS | Performed by: INTERNAL MEDICINE

## 2017-06-16 PROCEDURE — 85025 COMPLETE CBC W/AUTO DIFF WBC: CPT

## 2017-06-16 PROCEDURE — 36415 COLL VENOUS BLD VENIPUNCTURE: CPT

## 2017-06-16 PROCEDURE — 83540 ASSAY OF IRON: CPT

## 2017-06-16 PROCEDURE — 82728 ASSAY OF FERRITIN: CPT

## 2017-06-16 RX ORDER — NYSTATIN 100000 [USP'U]/G
1 POWDER TOPICAL DAILY
Qty: 45 G | Refills: 5 | Status: ON HOLD | OUTPATIENT
Start: 2017-06-16 | End: 2018-01-01

## 2017-06-16 NOTE — PROGRESS NOTES
HPI:    Patient ID: Vernell Current is a 80year old female. HPI Comments: The patient is here for follow-up of CHF and SADI. COPD. Seen pulmonary. Edema is slightly improved in the legs. She is taking Lasix on a twice daily basis.   Has chronic atrial Rfl: 3   metoprolol Tartrate 25 MG Oral Tab Take 1 tablet (25 mg total) by mouth 2 (two) times daily. Disp: 180 tablet Rfl: 3   PROAIR  (90 Base) MCG/ACT Inhalation Aero Soln Inhale 2 puffs into the lungs as needed.  Disp:  Rfl: 3   predniSONE 20 MG persistent (hcc)  (primary encounter diagnosis)  Julian treated with bipap  Chronic congestive heart failure, unspecified congestive heart failure type (hcc)  Anemia, unspecified type    Overall she looks better than she did last time.   I think her weight los

## 2017-06-20 ENCOUNTER — TELEPHONE (OUTPATIENT)
Dept: INTERNAL MEDICINE CLINIC | Facility: CLINIC | Age: 82
End: 2017-06-20

## 2017-06-20 RX ORDER — FERROUS SULFATE 325(65) MG
325 TABLET ORAL
Qty: 90 TABLET | Refills: 0 | Status: SHIPPED | OUTPATIENT
Start: 2017-06-20 | End: 2018-01-01

## 2017-06-20 NOTE — TELEPHONE ENCOUNTER
----- Message from Gertrude Nation MD sent at 6/19/2017 12:54 PM CDT -----  Iron levels are quite low. Suggest following the ferrous sulfate instructions given at the visit. .  Recheck CBC in 8 weeks.

## 2017-06-20 NOTE — TELEPHONE ENCOUNTER
Dr. Sobeida Gallagher - Spoke with the Patient's daughter, Mary Gimenez, who is quite angry. She says she filled out paperwork at your office regarding POA status, and still she is not in HIPPA in the chart.   Also, she would like to know what to do for her mother regarding h

## 2017-06-20 NOTE — TELEPHONE ENCOUNTER
Signed Prescriptions Disp Refills    Ferrous Sulfate 325 (65 Fe) MG Oral Tab 90 tablet 0      Sig: Take 1 tablet (325 mg total) by mouth daily with breakfast.        Authorizing Provider: Damaris Ashford

## 2017-06-20 NOTE — TELEPHONE ENCOUNTER
Informed Daughter per Dr. Sai Guerra recommendation regarding iron supplements.  Daughter is requesting iron supplements be sent [place]. Please advise

## 2017-06-20 NOTE — TELEPHONE ENCOUNTER
----- Message from Jim Cheng MD sent at 6/19/2017 12:54 PM CDT -----  Iron levels are quite low. Suggest following the ferrous sulfate instructions given at the visit. .  Recheck CBC in 8 weeks.

## 2017-06-22 ENCOUNTER — OFFICE VISIT (OUTPATIENT)
Dept: PULMONOLOGY | Facility: CLINIC | Age: 82
End: 2017-06-22

## 2017-06-22 VITALS
HEART RATE: 88 BPM | WEIGHT: 241 LBS | HEIGHT: 60 IN | BODY MASS INDEX: 47.32 KG/M2 | RESPIRATION RATE: 16 BRPM | SYSTOLIC BLOOD PRESSURE: 119 MMHG | OXYGEN SATURATION: 96 % | DIASTOLIC BLOOD PRESSURE: 69 MMHG

## 2017-06-22 DIAGNOSIS — G47.33 OSA (OBSTRUCTIVE SLEEP APNEA): Primary | ICD-10-CM

## 2017-06-22 PROCEDURE — 99204 OFFICE O/P NEW MOD 45 MIN: CPT | Performed by: INTERNAL MEDICINE

## 2017-06-22 PROCEDURE — G0463 HOSPITAL OUTPT CLINIC VISIT: HCPCS | Performed by: INTERNAL MEDICINE

## 2017-06-22 NOTE — H&P
Referring Physician  ABDULAZIZ Florentino MD    Chief Complaint  Dyspnea    History of Present Illness  Patient is a 27-year-old [facility] female who presents the pulmonary clinic for initial visit.   Her daughter states that over the course of last 6 months soco Prescriptions on File Prior to Visit:  Ferrous Sulfate 325 (65 Fe) MG Oral Tab Take 1 tablet (325 mg total) by mouth daily with breakfast. Disp: 90 tablet Rfl: 0   Nystatin (NYSTOP) 970651 UNIT/GM External Powder Apply 1 Application topically daily.  Disp: Cap Take 4,200 mg by mouth daily. Disp:  Rfl:    folic acid 253 MCG Oral Tab Take 400 mcg by mouth daily. Disp:  Rfl:      No current facility-administered medications on file prior to visit.     Allergies    Penicillins                 Physical Exam  Con with her CPAP device for underlying obstructive sleep apnea. I encouraged her to use her CPAP device as indicated.   I encourage weight loss if possible.  -I believe overall that her dyspnea is primarily secondary to her underlying diastolic heart failure,

## 2017-07-05 ENCOUNTER — TELEPHONE (OUTPATIENT)
Dept: FAMILY MEDICINE CLINIC | Facility: CLINIC | Age: 82
End: 2017-07-05

## 2017-07-05 NOTE — TELEPHONE ENCOUNTER
Actions Requested: Jatin Rivas RN  (DJ#[phone]) called for md update. Problem: Jatin Rivas RN seen pt  today noticed pt gained 9 pounds in one week. Posterior left lung lobe rales with no wheezing. B/P 120/70, heart rate 87, temp 98.2, 98% pulse ox.  Ambulates

## 2017-07-07 ENCOUNTER — OFFICE VISIT (OUTPATIENT)
Dept: INTERNAL MEDICINE CLINIC | Facility: CLINIC | Age: 82
End: 2017-07-07

## 2017-07-07 VITALS
DIASTOLIC BLOOD PRESSURE: 67 MMHG | SYSTOLIC BLOOD PRESSURE: 111 MMHG | BODY MASS INDEX: 47 KG/M2 | HEART RATE: 87 BPM | WEIGHT: 242 LBS

## 2017-07-07 DIAGNOSIS — I48.91 ATRIAL FIBRILLATION, NEW ONSET (HCC): ICD-10-CM

## 2017-07-07 DIAGNOSIS — I50.9 ACUTE ON CHRONIC CONGESTIVE HEART FAILURE, UNSPECIFIED CONGESTIVE HEART FAILURE TYPE: Primary | ICD-10-CM

## 2017-07-07 PROBLEM — J12.9 VIRAL PNEUMONIA, UNSPECIFIED: Chronic | Status: ACTIVE | Noted: 2017-02-01

## 2017-07-07 PROCEDURE — 99214 OFFICE O/P EST MOD 30 MIN: CPT | Performed by: INTERNAL MEDICINE

## 2017-07-07 PROCEDURE — G0463 HOSPITAL OUTPT CLINIC VISIT: HCPCS | Performed by: INTERNAL MEDICINE

## 2017-07-07 RX ORDER — HYDROCODONE BITARTRATE AND ACETAMINOPHEN 5; 325 MG/1; MG/1
1 TABLET ORAL EVERY 6 HOURS PRN
Qty: 100 TABLET | Refills: 0 | Status: SHIPPED | OUTPATIENT
Start: 2017-07-07 | End: 2017-08-11

## 2017-07-07 NOTE — PROGRESS NOTES
HPI:    Patient ID: Max Gomez is a 80year old female. Breathing Problem   She complains of difficulty breathing and shortness of breath. This is a recurrent problem. The current episode started in the past 7 days. The problem occurs constantly.  The (NYSTOP) 263384 UNIT/GM External Powder Apply 1 Application topically daily. Disp: 45 g Rfl: 5   methimazole 10 MG Oral Tab  Disp:  Rfl: 0   valsartan 80 MG Oral Tab 40 mg daily.    Disp:  Rfl: 0   Omeprazole 40 MG Oral Capsule Delayed Release Take 1 capsul kg)         Body mass index is 47.26 kg/m². PHYSICAL EXAM:   Physical Exam   Constitutional: She appears well-developed. No distress. HENT:   Head: Normocephalic. Eyes: No scleral icterus. Pulmonary/Chest: Effort normal. No accessory muscle usage.

## 2017-07-13 ENCOUNTER — TELEPHONE (OUTPATIENT)
Dept: INTERNAL MEDICINE CLINIC | Facility: CLINIC | Age: 82
End: 2017-07-13

## 2017-07-13 NOTE — TELEPHONE ENCOUNTER
pts daughter calling and stts that the med below has not done much for the pt. The pt still sounds like water in her lungs. Pts daughter would like to speak with dr Nafisa Black about this matter mostly. Tried to transfer to nurses, she declined. Please advise.

## 2017-07-18 NOTE — TELEPHONE ENCOUNTER
Discussed with the daughter. Lance Birch Apparently increasing from Lasix from 2 pills daily to 3 pills did nothing in terms of her edema. I advised her to contact home health and have them call me to get a lab order. I will have a BMP or CMP done on Thursday.

## 2017-07-18 NOTE — TELEPHONE ENCOUNTER
Pt's  daughter is calling back. Would like to speak with Dr. Roosevelt Eagle. She stated she was sorry she missed your call, but was available and will be by the phone this afternoon if you could try to give her a call again.

## 2017-07-18 NOTE — TELEPHONE ENCOUNTER
Daughter would like you to call her back as soon as possible. Per patient's daughter Hollie Loza, bilateral feet have pitting edema +3-4 to ankles, right greater than left, legs continue to be red and with blisters Lungs not audibly \"wet. \"     Home health

## 2017-07-19 ENCOUNTER — TELEPHONE (OUTPATIENT)
Dept: INTERNAL MEDICINE CLINIC | Facility: CLINIC | Age: 82
End: 2017-07-19

## 2017-07-19 DIAGNOSIS — I50.9 CHRONIC CONGESTIVE HEART FAILURE, UNSPECIFIED CONGESTIVE HEART FAILURE TYPE: Primary | ICD-10-CM

## 2017-07-19 DIAGNOSIS — R30.0 DYSURIA: Primary | ICD-10-CM

## 2017-07-19 NOTE — TELEPHONE ENCOUNTER
Sandra Vicente from [address] was calling to speak to Dr. Amado Rosales or RN's regarding patient. Stated that he was calling regarding lab orders that were going to be given to him. Please advise.

## 2017-07-19 NOTE — TELEPHONE ENCOUNTER
Actions Requested: Daughter requesting medication sent to pharmacy. Problem: burning upon urinating x2 days  Onset and Timin days ago  Associated Symptoms: Daughter reports burning when urinating, urinary frequency.  Denies fever, blood in urine or va

## 2017-07-21 ENCOUNTER — OFFICE VISIT (OUTPATIENT)
Dept: INTERNAL MEDICINE CLINIC | Facility: CLINIC | Age: 82
End: 2017-07-21

## 2017-07-21 VITALS — DIASTOLIC BLOOD PRESSURE: 69 MMHG | TEMPERATURE: 98 F | HEART RATE: 82 BPM | SYSTOLIC BLOOD PRESSURE: 105 MMHG

## 2017-07-21 DIAGNOSIS — R30.0 DYSURIA: ICD-10-CM

## 2017-07-21 DIAGNOSIS — R35.0 URINARY FREQUENCY: Primary | ICD-10-CM

## 2017-07-21 LAB
MULTISTIX LOT#: NORMAL NUMERIC
PH, URINE: 7.5 (ref 4.5–8)
SPECIFIC GRAVITY: 1.01 (ref 1–1.03)
URINE-COLOR: YELLOW
UROBILINOGEN,SEMI-QN: 0.2 MG/DL (ref 0–1.9)

## 2017-07-21 PROCEDURE — 99213 OFFICE O/P EST LOW 20 MIN: CPT | Performed by: INTERNAL MEDICINE

## 2017-07-21 PROCEDURE — 87077 CULTURE AEROBIC IDENTIFY: CPT | Performed by: INTERNAL MEDICINE

## 2017-07-21 PROCEDURE — 87186 SC STD MICRODIL/AGAR DIL: CPT | Performed by: INTERNAL MEDICINE

## 2017-07-21 PROCEDURE — G0463 HOSPITAL OUTPT CLINIC VISIT: HCPCS | Performed by: INTERNAL MEDICINE

## 2017-07-21 PROCEDURE — 87086 URINE CULTURE/COLONY COUNT: CPT | Performed by: INTERNAL MEDICINE

## 2017-07-21 PROCEDURE — 81003 URINALYSIS AUTO W/O SCOPE: CPT | Performed by: INTERNAL MEDICINE

## 2017-07-21 RX ORDER — CIPROFLOXACIN 250 MG/1
250 TABLET, FILM COATED ORAL 2 TIMES DAILY
Qty: 20 TABLET | Refills: 0 | Status: SHIPPED | OUTPATIENT
Start: 2017-07-21 | End: 2017-09-15

## 2017-07-21 NOTE — PROGRESS NOTES
HPI:    Patient ID: Mindy Singh is a 80year old female. UTI   This is a new problem. The current episode started in the past 7 days. The problem occurs constantly. The problem has been unchanged. Associated symptoms include weakness.  Pertinent negati by mouth nightly. Disp:  Rfl:    Cranberry-Vitamin C-Vitamin E (CRANBERRY PLUS VITAMIN C) 4200-20-3 MG-MG-UNIT Oral Cap Take 4,200 mg by mouth daily. Disp:  Rfl:    folic acid 276 MCG Oral Tab Take 400 mcg by mouth daily.  Disp:  Rfl:    Nystatin (NYSTOP) alert.   Skin: Skin is warm and dry. She is not diaphoretic. Psychiatric: She has a normal mood and affect. Her behavior is normal.   Vitals reviewed.              ASSESSMENT/PLAN:   Urinary frequency  (primary encounter diagnosis)  Dysuria    UA consiste

## 2017-07-21 NOTE — TELEPHONE ENCOUNTER
Lenoard Leyden Daughter made appt for 2pm today with Dr Claudio Linndale. Patient feels more pain with urination; symptoms worsening since c/o 5 days ago.

## 2017-07-21 NOTE — TELEPHONE ENCOUNTER
Chacorta Del Cid (daughter) with pt and informed about getting ua/cs done today as this was not done yesterday. Pt to increase fluids. She verbalized understanding and will get it completed today. Tasked to Dr Apple Jacobson for update.

## 2017-07-21 NOTE — TELEPHONE ENCOUNTER
Spoke to Blane Tubbs. Informed HH RN can collect both cmp and Ua/UC. However Blane Tubbs decided to bring patient in for OV. Her urinary symptoms progressively worsening. See other acute encounter.

## 2017-07-21 NOTE — TELEPHONE ENCOUNTER
Per Kyler Hanson daughter of pt, would like -to knopw if the [facility] nurse can just do the blood work and UA together today instead of pt going to [facility] lab for the blood work and HH/RN for the UA. Pls advise.

## 2017-07-26 ENCOUNTER — TELEPHONE (OUTPATIENT)
Dept: FAMILY MEDICINE CLINIC | Facility: CLINIC | Age: 82
End: 2017-07-26

## 2017-07-26 NOTE — TELEPHONE ENCOUNTER
----- Message from Ryan Han MD sent at 7/25/2017 12:23 PM CDT -----  Culture grew E. coli. .  Should be very sensitive to the medication gave her. How is she doing?

## 2017-07-27 NOTE — TELEPHONE ENCOUNTER
Daughter reports that pt is feeling better. She will continue and complete the rx. Daughter also asking that they not be charged for the 7/11 appt. Pt had that appt but then was asked to come in 7/9 so that appt was to be cancelled by our staff.   Vidya Pittman

## 2017-07-28 NOTE — TELEPHONE ENCOUNTER
Spoke with patient's daughter (verified name and ), reviewed information, patient verbalized understanding and agrees with plan. She will bring her to have it repeated in the morning.     The patient's daughter would also like to know if she will be able

## 2017-07-29 ENCOUNTER — APPOINTMENT (OUTPATIENT)
Dept: LAB | Age: 82
End: 2017-07-29
Attending: INTERNAL MEDICINE
Payer: MEDICARE

## 2017-07-29 DIAGNOSIS — I50.9 CHRONIC CONGESTIVE HEART FAILURE, UNSPECIFIED CONGESTIVE HEART FAILURE TYPE: ICD-10-CM

## 2017-07-29 DIAGNOSIS — R30.0 DYSURIA: ICD-10-CM

## 2017-07-29 LAB
ALBUMIN SERPL BCP-MCNC: 3.6 G/DL (ref 3.5–4.8)
ALBUMIN/GLOB SERPL: 1.5 {RATIO} (ref 1–2)
ALP SERPL-CCNC: 59 U/L (ref 32–100)
ALT SERPL-CCNC: 17 U/L (ref 14–54)
ANION GAP SERPL CALC-SCNC: 11 MMOL/L (ref 0–18)
AST SERPL-CCNC: 17 U/L (ref 15–41)
BACTERIA UR QL AUTO: NEGATIVE /HPF
BILIRUB SERPL-MCNC: 0.5 MG/DL (ref 0.3–1.2)
BILIRUB UR QL: NEGATIVE
BUN SERPL-MCNC: 20 MG/DL (ref 8–20)
BUN/CREAT SERPL: 25 (ref 10–20)
CALCIUM SERPL-MCNC: 9.2 MG/DL (ref 8.5–10.5)
CHLORIDE SERPL-SCNC: 102 MMOL/L (ref 95–110)
CO2 SERPL-SCNC: 29 MMOL/L (ref 22–32)
COLOR UR: YELLOW
CREAT SERPL-MCNC: 0.8 MG/DL (ref 0.5–1.5)
GLOBULIN PLAS-MCNC: 2.4 G/DL (ref 2.5–3.7)
GLUCOSE SERPL-MCNC: 105 MG/DL (ref 70–99)
GLUCOSE UR-MCNC: NEGATIVE MG/DL
HGB UR QL STRIP.AUTO: NEGATIVE
KETONES UR-MCNC: NEGATIVE MG/DL
NITRITE UR QL STRIP.AUTO: NEGATIVE
OSMOLALITY UR CALC.SUM OF ELEC: 297 MOSM/KG (ref 275–295)
PH UR: 5 [PH] (ref 5–8)
POTASSIUM SERPL-SCNC: 4.4 MMOL/L (ref 3.3–5.1)
PROT SERPL-MCNC: 6 G/DL (ref 5.9–8.4)
PROT UR-MCNC: 30 MG/DL
RBC #/AREA URNS AUTO: 1 /HPF
SODIUM SERPL-SCNC: 142 MMOL/L (ref 136–144)
SP GR UR STRIP: 1.02 (ref 1–1.03)
UROBILINOGEN UR STRIP-ACNC: <2
VIT C UR-MCNC: 40 MG/DL
WBC #/AREA URNS AUTO: 7 /HPF

## 2017-07-29 PROCEDURE — 81001 URINALYSIS AUTO W/SCOPE: CPT

## 2017-07-29 PROCEDURE — 36415 COLL VENOUS BLD VENIPUNCTURE: CPT

## 2017-07-29 PROCEDURE — 80053 COMPREHEN METABOLIC PANEL: CPT

## 2017-07-29 PROCEDURE — 87086 URINE CULTURE/COLONY COUNT: CPT

## 2017-08-01 ENCOUNTER — TELEPHONE (OUTPATIENT)
Dept: FAMILY MEDICINE CLINIC | Facility: CLINIC | Age: 82
End: 2017-08-01

## 2017-08-05 RX ORDER — DILTIAZEM HYDROCHLORIDE 120 MG/1
CAPSULE, COATED, EXTENDED RELEASE ORAL
Qty: 60 CAPSULE | Refills: 0 | Status: SHIPPED | OUTPATIENT
Start: 2017-08-05 | End: 2017-08-07

## 2017-08-05 RX ORDER — DILTIAZEM HYDROCHLORIDE 120 MG/1
CAPSULE, COATED, EXTENDED RELEASE ORAL
Qty: 60 CAPSULE | Refills: 0 | Status: SHIPPED | OUTPATIENT
Start: 2017-08-05 | End: 2017-10-26

## 2017-08-07 ENCOUNTER — TELEPHONE (OUTPATIENT)
Dept: INTERNAL MEDICINE CLINIC | Facility: CLINIC | Age: 82
End: 2017-08-07

## 2017-08-07 RX ORDER — DILTIAZEM HYDROCHLORIDE 120 MG/1
120 CAPSULE, COATED, EXTENDED RELEASE ORAL
Qty: 90 CAPSULE | Refills: 0 | Status: SHIPPED | OUTPATIENT
Start: 2017-08-07 | End: 2017-10-26

## 2017-08-07 RX ORDER — DILTIAZEM HYDROCHLORIDE 120 MG/1
120 CAPSULE, COATED, EXTENDED RELEASE ORAL
Qty: 90 CAPSULE | Refills: 0 | Status: SHIPPED | OUTPATIENT
Start: 2017-08-07 | End: 2017-08-07

## 2017-08-07 NOTE — TELEPHONE ENCOUNTER
Pts daughter Gordon Lebron called to check status on RX. Pt is out of meds and did not take any today. Please call when RX is sent to pharm.

## 2017-08-07 NOTE — TELEPHONE ENCOUNTER
Patient is out of her xarelto.    Needs 90 to mail order (please see separate encounter) and 30 to local.  Pended above    Hypertensive Medications  Protocol Criteria:  · Appointment scheduled in the past 6 months or in the next 3 months  · BMP or CMP in th

## 2017-08-07 NOTE — TELEPHONE ENCOUNTER
Refilled Diltiazem per protocol  Resent for 90 days. Please advise on Rivaroxaban   Patient need 30 to local pharmacy and 90 to mail order which is pended above. Please see separate encounter for 30 day to local encounter.       Hypertensive Medications

## 2017-08-08 ENCOUNTER — TELEPHONE (OUTPATIENT)
Dept: INTERNAL MEDICINE CLINIC | Facility: CLINIC | Age: 82
End: 2017-08-08

## 2017-08-08 RX ORDER — RIVAROXABAN 20 MG/1
TABLET, FILM COATED ORAL
Qty: 90 TABLET | Refills: 0 | Status: SHIPPED | OUTPATIENT
Start: 2017-08-08 | End: 2017-10-23

## 2017-08-11 ENCOUNTER — OFFICE VISIT (OUTPATIENT)
Dept: INTERNAL MEDICINE CLINIC | Facility: CLINIC | Age: 82
End: 2017-08-11

## 2017-08-11 VITALS
DIASTOLIC BLOOD PRESSURE: 65 MMHG | SYSTOLIC BLOOD PRESSURE: 101 MMHG | BODY MASS INDEX: 47 KG/M2 | WEIGHT: 240 LBS | HEART RATE: 88 BPM

## 2017-08-11 DIAGNOSIS — J44.1 COPD EXACERBATION (HCC): ICD-10-CM

## 2017-08-11 DIAGNOSIS — I50.9 ACUTE ON CHRONIC CONGESTIVE HEART FAILURE, UNSPECIFIED CONGESTIVE HEART FAILURE TYPE: Primary | ICD-10-CM

## 2017-08-11 DIAGNOSIS — I48.91 ATRIAL FIBRILLATION, NEW ONSET (HCC): ICD-10-CM

## 2017-08-11 PROCEDURE — G0463 HOSPITAL OUTPT CLINIC VISIT: HCPCS | Performed by: INTERNAL MEDICINE

## 2017-08-11 PROCEDURE — 99214 OFFICE O/P EST MOD 30 MIN: CPT | Performed by: INTERNAL MEDICINE

## 2017-08-11 RX ORDER — OMEPRAZOLE 40 MG/1
CAPSULE, DELAYED RELEASE ORAL
Qty: 90 CAPSULE | Refills: 0 | Status: SHIPPED | OUTPATIENT
Start: 2017-08-11 | End: 2018-01-01

## 2017-08-11 RX ORDER — HYDROCODONE BITARTRATE AND ACETAMINOPHEN 5; 325 MG/1; MG/1
1 TABLET ORAL EVERY 6 HOURS PRN
Qty: 100 TABLET | Refills: 0 | Status: SHIPPED | OUTPATIENT
Start: 2017-08-11 | End: 2017-09-14

## 2017-08-11 NOTE — PROGRESS NOTES
HPI:    Patient ID: Nicole Rahman is a 80year old female. .  Patient has a history of CHF. She is prone to lower extremity edema. This is been much improved on increased Lasix dose. Denies shortness of breath or chest pain.       COPD   This is a chr 120 MG Oral Capsule SR 24 Hr Take 1 capsule (120 mg total) by mouth once daily.  Disp: 90 capsule Rfl: 0   DILTIAZEM HCL ER COATED BEADS 120 MG Oral Capsule SR 24 Hr TAKE ONE CAPSULE BY MOUTH DAILY Disp: 60 capsule Rfl: 0   BETAMETHASONE VALERATE 0.1 % Exte Rfl: 5     Allergies:  Penicillins                    08/11/17  1501   BP: 101/65   Pulse: 88   Weight: 240 lb (108.9 kg)         Body mass index is 46.87 kg/m². PHYSICAL EXAM:   Physical Exam   Constitutional: She appears well-developed. No distress.

## 2017-08-12 ENCOUNTER — TELEPHONE (OUTPATIENT)
Dept: INTERNAL MEDICINE CLINIC | Facility: CLINIC | Age: 82
End: 2017-08-12

## 2017-08-12 NOTE — TELEPHONE ENCOUNTER
Caron Duran from [facility] called to follow up on forms for release for treatment. Patient will be getting x-rays, filling extraction, gum fillings - wants to know of any precautions to take when doing the procedure. Forms must be filled out and faxed back to [phone]. Please advise. Thank you!

## 2017-08-23 NOTE — TELEPHONE ENCOUNTER
[facility] [phone] and fax [phone] is calling for status of the form. Per [facility] pt cannot be scheduled until the form is received. Caron Duran would like a call back today.

## 2017-09-05 ENCOUNTER — TELEPHONE (OUTPATIENT)
Dept: OTHER | Age: 82
End: 2017-09-05

## 2017-09-05 NOTE — TELEPHONE ENCOUNTER
Dr Gonzalez Simon: per daughter, was taking half of Valsartan 80mg. Daughter would like to know if patient continues on 40mg daily of valsartan? Patient will need a new rx. Please advise.

## 2017-09-06 NOTE — TELEPHONE ENCOUNTER
Returned call to pt, daughter, [facility] answered is NOT on HIPPA. Refuses to allow pt to speak on  Phone. Requests Dr to call her back directly. Very rude on phone.

## 2017-09-08 RX ORDER — VALSARTAN 80 MG/1
40 TABLET ORAL DAILY
Qty: 90 TABLET | Refills: 3 | Status: SHIPPED | OUTPATIENT
Start: 2017-09-08 | End: 2017-01-01

## 2017-09-08 NOTE — TELEPHONE ENCOUNTER
Per Dr Abel Morin, [facility] to order valsartan 80mg, take 1/2 pill by mouth daily, #90, 3 refills      Signed Prescriptions Disp Refills    valsartan 80 MG Oral Tab 90 tablet 3      Sig: Take 0.5 tablets (40 mg total) by mouth daily.         Authorizing Provider: Ford Narayan,

## 2017-09-12 ENCOUNTER — MYAURORA ACCOUNT LINK (OUTPATIENT)
Dept: OTHER | Age: 82
End: 2017-09-12

## 2017-09-14 ENCOUNTER — TELEPHONE (OUTPATIENT)
Dept: INTERNAL MEDICINE CLINIC | Facility: CLINIC | Age: 82
End: 2017-09-14

## 2017-09-14 RX ORDER — HYDROCODONE BITARTRATE AND ACETAMINOPHEN 5; 325 MG/1; MG/1
1 TABLET ORAL EVERY 6 HOURS PRN
Qty: 100 TABLET | Refills: 0 | Status: SHIPPED | OUTPATIENT
Start: 2017-09-14 | End: 2017-10-20

## 2017-09-15 ENCOUNTER — NURSE TRIAGE (OUTPATIENT)
Dept: OTHER | Age: 82
End: 2017-09-15

## 2017-09-15 ENCOUNTER — PRIOR ORIGINAL RECORDS (OUTPATIENT)
Dept: OTHER | Age: 82
End: 2017-09-15

## 2017-09-15 RX ORDER — CIPROFLOXACIN 250 MG/1
250 TABLET, FILM COATED ORAL 2 TIMES DAILY
Qty: 20 TABLET | Refills: 0 | Status: SHIPPED | OUTPATIENT
Start: 2017-09-15 | End: 2017-09-25

## 2017-09-15 NOTE — TELEPHONE ENCOUNTER
Spoke with daughter Cari Lester and relayed Dr. Yfn Kerr as requested. No further questions/concerns at this time.

## 2017-09-15 NOTE — TELEPHONE ENCOUNTER
Action Requested: Summary for Provider     []  Critical Lab, Recommendations Needed  [] Need Additional Advice  []   FYI    []   Need Orders  [x] Need Medications Sent to Pharmacy  []  Other     SUMMARY: Daughter Kyler Hanson requesting  to send prescripti

## 2017-09-15 NOTE — TELEPHONE ENCOUNTER
Signed Prescriptions Disp Refills    Ciprofloxacin HCl 250 MG Oral Tab 20 tablet 0      Sig: Take 1 tablet (250 mg total) by mouth 2 (two) times daily.          Sent erx

## 2017-09-20 RX ORDER — DILTIAZEM HYDROCHLORIDE 120 MG/1
CAPSULE, COATED, EXTENDED RELEASE ORAL
Qty: 90 CAPSULE | Refills: 0 | Status: SHIPPED | OUTPATIENT
Start: 2017-09-20 | End: 2017-10-23

## 2017-09-20 RX ORDER — OMEPRAZOLE 40 MG/1
CAPSULE, DELAYED RELEASE ORAL
Qty: 90 CAPSULE | Refills: 0 | OUTPATIENT
Start: 2017-09-20

## 2017-09-20 NOTE — TELEPHONE ENCOUNTER
Patient no longer wants to go to [facility] [place] and wants to have orders sent back to [facility] in [place]. Orders pended. Thanks   Please advise  · Appointment scheduled in the past 6 months or in the next 3 months  Recent Outpatient Visits            1 month ago Acute on chronic congestive heart failure, unspecified congestive heart failure type [facility])    [facility], [facility], [address],

## 2017-09-20 NOTE — TELEPHONE ENCOUNTER
Hypertensive Medications  Protocol Criteria: refilled per protocol  · Appointment scheduled in the past 6 months or in the next 3 months  · BMP or CMP in the past 12 months  · Creatinine result < 2  Recent Outpatient Visits            1 month ago Acute on

## 2017-09-21 ENCOUNTER — TELEPHONE (OUTPATIENT)
Dept: INTERNAL MEDICINE CLINIC | Facility: CLINIC | Age: 82
End: 2017-09-21

## 2017-09-21 RX ORDER — POTASSIUM CHLORIDE 20 MEQ/1
TABLET, EXTENDED RELEASE ORAL
Qty: 90 TABLET | Refills: 0 | Status: SHIPPED | OUTPATIENT
Start: 2017-09-21 | End: 2017-10-08

## 2017-09-21 NOTE — TELEPHONE ENCOUNTER
Hypertensive Medications  Protocol Criteria:  · Appointment scheduled in the past 6 months or in the next 3 months  · BMP or CMP in the past 12 months  · Creatinine result < 2  Recent Outpatient Visits            1 month ago Acute on chronic congestive hea

## 2017-09-24 ENCOUNTER — HOSPITAL ENCOUNTER (OUTPATIENT)
Dept: CT IMAGING | Facility: HOSPITAL | Age: 82
Discharge: HOME | End: 2017-09-24
Attending: INTERNAL MEDICINE
Payer: MEDICARE

## 2017-09-24 DIAGNOSIS — I31.3 PERICARDIAL EFFUSION: ICD-10-CM

## 2017-09-24 LAB — CREAT BLD-MCNC: 0.9 MG/DL (ref 0.5–1.5)

## 2017-09-24 PROCEDURE — 82565 ASSAY OF CREATININE: CPT

## 2017-09-24 PROCEDURE — 71260 CT THORAX DX C+: CPT | Performed by: INTERNAL MEDICINE

## 2017-09-25 ENCOUNTER — PRIOR ORIGINAL RECORDS (OUTPATIENT)
Dept: OTHER | Age: 82
End: 2017-09-25

## 2017-09-25 ENCOUNTER — TELEPHONE (OUTPATIENT)
Dept: OTHER | Age: 82
End: 2017-09-25

## 2017-09-25 RX ORDER — FUROSEMIDE 40 MG/1
40 TABLET ORAL 2 TIMES DAILY
Qty: 180 TABLET | Refills: 0 | Status: SHIPPED | OUTPATIENT
Start: 2017-09-25 | End: 2017-01-01

## 2017-09-25 NOTE — TELEPHONE ENCOUNTER
Hypertensive Medications: Medication filled for 90 days per protocol.     Protocol Criteria:  · Appointment scheduled in the past 6 months or in the next 3 months  · BMP or CMP in the past 12 months  · Creatinine result < 2  Recent Outpatient Visits

## 2017-09-25 NOTE — TELEPHONE ENCOUNTER
Daughter called, states that [facility] mail order sent 2 requests for furosemide but it still has not been refilled. Pt used last pill yesterday and is now out of medication. Daughter, Hollie Loza is requesting a 80 day supply to the local pharmacy (verfied).

## 2017-10-03 ENCOUNTER — PRIOR ORIGINAL RECORDS (OUTPATIENT)
Dept: OTHER | Age: 82
End: 2017-10-03

## 2017-10-08 RX ORDER — POTASSIUM CHLORIDE 20 MEQ/1
TABLET, EXTENDED RELEASE ORAL
Qty: 90 TABLET | Refills: 0 | Status: SHIPPED | OUTPATIENT
Start: 2017-10-08 | End: 2017-01-01

## 2017-10-13 ENCOUNTER — PATIENT OUTREACH (OUTPATIENT)
Dept: INTERNAL MEDICINE CLINIC | Facility: CLINIC | Age: 82
End: 2017-10-13

## 2017-10-17 RX ORDER — GABAPENTIN 100 MG/1
100 CAPSULE ORAL NIGHTLY
Qty: 90 CAPSULE | Refills: 0 | Status: SHIPPED | OUTPATIENT
Start: 2017-10-17 | End: 2017-10-23

## 2017-10-17 RX ORDER — MECLIZINE HCL 12.5 MG/1
12.5 TABLET ORAL 3 TIMES DAILY PRN
Qty: 180 TABLET | Refills: 0 | Status: SHIPPED | OUTPATIENT
Start: 2017-10-17 | End: 2017-10-23

## 2017-10-17 RX ORDER — GABAPENTIN 100 MG/1
100 CAPSULE ORAL NIGHTLY
Qty: 7 CAPSULE | Refills: 0 | Status: SHIPPED | OUTPATIENT
Start: 2017-10-17 | End: 2017-10-17

## 2017-10-20 ENCOUNTER — OFFICE VISIT (OUTPATIENT)
Dept: INTERNAL MEDICINE CLINIC | Facility: CLINIC | Age: 82
End: 2017-10-20

## 2017-10-20 VITALS
SYSTOLIC BLOOD PRESSURE: 99 MMHG | BODY MASS INDEX: 48 KG/M2 | DIASTOLIC BLOOD PRESSURE: 66 MMHG | HEART RATE: 89 BPM | TEMPERATURE: 98 F | WEIGHT: 243.63 LBS

## 2017-10-20 DIAGNOSIS — I50.9 ACUTE ON CHRONIC CONGESTIVE HEART FAILURE, UNSPECIFIED CONGESTIVE HEART FAILURE TYPE: Primary | ICD-10-CM

## 2017-10-20 DIAGNOSIS — H04.203 EXCESSIVE TEARING, BILATERAL: ICD-10-CM

## 2017-10-20 PROCEDURE — G0463 HOSPITAL OUTPT CLINIC VISIT: HCPCS | Performed by: INTERNAL MEDICINE

## 2017-10-20 PROCEDURE — 99213 OFFICE O/P EST LOW 20 MIN: CPT | Performed by: INTERNAL MEDICINE

## 2017-10-20 RX ORDER — HYDROCODONE BITARTRATE AND ACETAMINOPHEN 5; 325 MG/1; MG/1
1 TABLET ORAL EVERY 6 HOURS PRN
Qty: 100 TABLET | Refills: 0 | Status: SHIPPED | OUTPATIENT
Start: 2017-10-20 | End: 2017-01-01

## 2017-10-20 RX ORDER — POTASSIUM CHLORIDE 1500 MG/1
TABLET, FILM COATED, EXTENDED RELEASE ORAL
Refills: 0 | COMMUNITY
Start: 2017-10-09 | End: 2017-10-26

## 2017-10-20 NOTE — PROGRESS NOTES
HPI:    Patient ID: Jaycob Emanuel is a 80year old female. Pt arrives with daughter. Pt is in a wheelchair and has a cane. HPI    Bilateral Eye Problem  Daughter reports the pt having chronic eye problems.  Pt went to eye doctor a few times for persist times daily as needed. Disp: 180 tablet Rfl: 0   gabapentin 100 MG Oral Cap Take 1 capsule (100 mg total) by mouth nightly. Disp: 90 capsule Rfl: 0   furosemide 40 MG Oral Tab Take 1 tablet (40 mg total) by mouth 2 (two) times daily.  Disp: 180 tablet Rfl: mouth daily.  Disp:  Rfl:    Potassium Chloride ER 20 MEQ Oral Tab CR  Disp:  Rfl: 0   POTASSIUM CHLORIDE ER 20 MEQ Oral Tab CR TAKE 1 TABLET BY MOUTH DAILY Disp: 90 tablet Rfl: 0   DILTIAZEM HCL ER COATED BEADS 120 MG Oral Capsule SR 24 Hr TAKE ONE CAPSULE problem.  -On exam, pt unremarkable. -Referral for OPHTHALMOLOGY - INTERNAL    -Refilled HYDROcodone-acetaminophen 5-325 MG      No orders of the defined types were placed in this encounter.       Meds This Visit:    Signed Prescriptions Disp Refills    HY

## 2017-10-23 ENCOUNTER — PATIENT OUTREACH (OUTPATIENT)
Dept: INTERNAL MEDICINE CLINIC | Facility: CLINIC | Age: 82
End: 2017-10-23

## 2017-10-23 RX ORDER — DILTIAZEM HYDROCHLORIDE 120 MG/1
120 CAPSULE, COATED, EXTENDED RELEASE ORAL
Qty: 90 CAPSULE | Refills: 3 | Status: SHIPPED | OUTPATIENT
Start: 2017-10-23 | End: 2018-01-01

## 2017-10-23 RX ORDER — GABAPENTIN 100 MG/1
100 CAPSULE ORAL NIGHTLY
Qty: 90 CAPSULE | Refills: 3 | Status: SHIPPED | OUTPATIENT
Start: 2017-10-23 | End: 2018-01-01

## 2017-10-23 RX ORDER — MECLIZINE HCL 12.5 MG/1
12.5 TABLET ORAL 3 TIMES DAILY PRN
Qty: 180 TABLET | Refills: 3 | Status: SHIPPED | OUTPATIENT
Start: 2017-10-23 | End: 2017-01-01

## 2017-10-23 NOTE — TELEPHONE ENCOUNTER
Please advise on refill request. OK to send a two-week supply to a local pharmacy as well for the patient?     Hypertensive Medications  Protocol Criteria:  · Appointment scheduled in the past 6 months or in the next 3 months  · BMP or CMP in the past 12 mo in the past 6 months or in the next 3 months  Recent Outpatient Visits            3 days ago Acute on chronic congestive heart failure, unspecified congestive heart failure type [facility])    [address] [facility] [facility], Kym Sanchez MD    Office Vi

## 2017-10-23 NOTE — TELEPHONE ENCOUNTER
THESE ALL GO TO [email or web address] [facility]   90 DAY AND SUPPLY  3 REFILLS     PLEASE SEND GABAPENTIN TO THE LOCAL PHARMACY FOR 2 WEEKS SUPPLY  TILL MAIL ORDER ARRIVES   [facility] [place], [place]     Current Outpatient Prescriptions:  Meclizine HCl 12.5 MG Oral Ta

## 2017-10-23 NOTE — PROGRESS NOTES
Patient identified with a potential need for Chronic Care Management services. Called patient  and spoke with daughter Mohsen Quinonez in file to introduce self and availability of Chronic Care Management services.   Patient's daughter Karma Edenalexander informed about the f

## 2017-10-24 RX ORDER — GABAPENTIN 100 MG/1
CAPSULE ORAL
Qty: 14 CAPSULE | Refills: 0 | Status: SHIPPED | OUTPATIENT
Start: 2017-10-24 | End: 2017-10-26

## 2017-10-24 NOTE — TELEPHONE ENCOUNTER
Patient's daughter called, [employer] verified , she requested 2 week supply Gabapentin to be sent to [facility] while awaiting the order sent to [facility] yesterday erx sent

## 2017-10-25 NOTE — PROGRESS NOTES
Spoke with dtr Braeden Rojas. Scheduled to speak with her and pt tomorrow morning at 9 am for initial CCM outreach call.

## 2017-10-26 ENCOUNTER — PATIENT OUTREACH (OUTPATIENT)
Dept: INTERNAL MEDICINE CLINIC | Facility: CLINIC | Age: 82
End: 2017-10-26

## 2017-10-26 ENCOUNTER — TELEPHONE (OUTPATIENT)
Dept: INTERNAL MEDICINE CLINIC | Facility: CLINIC | Age: 82
End: 2017-10-26

## 2017-10-26 DIAGNOSIS — I50.9 ACUTE ON CHRONIC CONGESTIVE HEART FAILURE, UNSPECIFIED CONGESTIVE HEART FAILURE TYPE: ICD-10-CM

## 2017-10-26 DIAGNOSIS — R73.9 HYPERGLYCEMIA: ICD-10-CM

## 2017-10-26 DIAGNOSIS — J44.1 COPD EXACERBATION (HCC): ICD-10-CM

## 2017-10-26 DIAGNOSIS — G47.33 OSA TREATED WITH BIPAP: ICD-10-CM

## 2017-10-26 DIAGNOSIS — I48.19 ATRIAL FIBRILLATION, PERSISTENT (HCC): Chronic | ICD-10-CM

## 2017-10-26 PROCEDURE — 99490 CHRNC CARE MGMT STAFF 1ST 20: CPT | Performed by: INTERNAL MEDICINE

## 2017-10-26 RX ORDER — GUAIFENESIN 600 MG
1200 TABLET, EXTENDED RELEASE 12 HR ORAL 2 TIMES DAILY
COMMUNITY
End: 2017-01-01 | Stop reason: ALTCHOICE

## 2017-10-26 RX ORDER — OYSTER SHELL CALCIUM WITH VITAMIN D 500; 200 MG/1; [IU]/1
1 TABLET, FILM COATED ORAL DAILY
Status: ON HOLD | COMMUNITY
End: 2018-01-01

## 2017-10-26 NOTE — TELEPHONE ENCOUNTER
Spoke with daughter Dallin Guillen (HIPPA signed) about Dr Sai Guerra note below. She says mom not aware of goiter and has not had work up done yet. Would like to know what Dr Abel Morin suggests next? Please advise. Thank you.

## 2017-10-26 NOTE — PROGRESS NOTES
10/26/2017  Spoke to Earl at length about CCM, current care plan, performed CCM assessment, reviewed meds, and med compliance.  Reviewed/explained pt Patient Active Problem List:     Hyperglycemia     Azotemia     Atrial fibrillation, new onset ([facility] [address]     El weekly)  at this time. Had PT in the past.    Current Issue:  Eyes- having issue with eyes excessive tearing. [facility] Vilma Wood [facility] for Opthomology currently. Started on eye drops.    Knee Pain- has had surgery ALEJA TKA, still having pain issues with th [address] [facility], [address] [facility] [address])        [facility], [place] [address]  [address]  [address] [facility] [address] [phone] [address] [employer], [address] [facility]

## 2017-10-26 NOTE — TELEPHONE ENCOUNTER
----- Message from Glendy Kate RN sent at 10/25/2017 12:42 PM CDT -----      ----- Message -----  From: Joleen Amado MD  Sent: 10/25/2017  12:30 PM  To: Jana Garcia Lpn/Shad    Incidental finding on recent CT scan was large goiter.   This apparently ha

## 2017-10-30 ENCOUNTER — TELEPHONE (OUTPATIENT)
Dept: INTERNAL MEDICINE CLINIC | Facility: CLINIC | Age: 82
End: 2017-10-30

## 2017-10-30 NOTE — TELEPHONE ENCOUNTER
Macular Risk DNA test shows LOW GENETIC risk. Please see acute encounter 7/13: Per Dr. Daquan George note:    Kriss Rendon MD Physician Signed  Date of Service: 07/18/2017 6:28 PM   Bookmark Copy       Discussed with the daughter. Rosebud Galea   Apparently increasing from Lasix from 2 pills daily to 3 pills did nothin

## 2017-11-01 NOTE — TELEPHONE ENCOUNTER
Hypertensive Medications  Protocol Criteria:  · Appointment scheduled in the past 6 months or in the next 3 months  · BMP or CMP in the past 12 months  · Creatinine result < 2  Recent Outpatient Visits            1 week ago Acute on chronic congestive hear

## 2017-11-06 NOTE — TELEPHONE ENCOUNTER
Action Requested: Summary for Provider     []  Critical Lab, Recommendations Needed  [] Need Additional Advice  [x]   FYI    []   Need Orders  [] Need Medications Sent to Pharmacy  []  Other     Glorious Shells called reports x3 days, productive yellow co

## 2017-11-06 NOTE — PROGRESS NOTES
HPI:    Patient ID: Jarred Mo is a 80year old female. Pt here with her daughter. Pt is in a wheelchair. HPI     Cough  This is a recurrent problem. The problem occurs constantly.  Associated symptoms include congestion (nasal) and a sore throat (mi mouth 3 (three) times daily as needed for cough. Disp: 12 capsule Rfl: 0   Fluticasone Propionate 50 MCG/ACT Nasal Suspension 2 sprays by Each Nare route daily.  Disp: 1 Bottle Rfl: 3   metoprolol Tartrate 25 MG Oral Tab Take 1 tablet (25 mg total) by mouth puffs into the lungs as needed. Disp:  Rfl: 3   predniSONE 20 MG Oral Tab Take 1 tablet (20 mg total) by mouth daily. Disp: 12 tablet Rfl: 0   simvastatin 5 MG Oral Tab Take 20 mg by mouth nightly.  Disp:  Rfl:    magnesium 250 MG Oral Tab Take 250 mg by mo Z-ERIC) 250 MG Oral Tab 6 tablet 0      Sig: Take two tablets by mouth today, then one tablet daily. benzonatate 200 MG Oral Cap 12 capsule 0      Sig: Take 1 capsule (200 mg total) by mouth 3 (three) times daily as needed for cough.       Fluticasone P

## 2017-11-08 NOTE — TELEPHONE ENCOUNTER
Spoke with patient's daughter (identified name and date of birth), results reviewed and patient's daughter agrees with plan and will call Dr. Martir Matthews to schedule appt.

## 2017-11-08 NOTE — TELEPHONE ENCOUNTER
LMTCB  Transfer to triage    Notes Recorded by Charan Yung MD on 11/7/2017 at 5:47 PM CST  Patient's chest x-ray reveals large left pleural effusion.  Needs to follow-up with pulmonary

## 2017-11-10 NOTE — TELEPHONE ENCOUNTER
Dr Jayden Hernandez (daughter) called to inform you mother still with lingering wheezing, some sob with exertion and feeling tired. Requesting refill on benzonatate 200mg one capsule tid prn for cough which helps.  Refill provided with 12 capsule and 0 refil

## 2017-11-17 NOTE — TELEPHONE ENCOUNTER
Pt to have US Guided Left Sided Thoracentesis for L pleural effusion, CXR s/p procedure, no labs, & to stop Xarelto 2 days prior to procedure per Dr. Smith Ernandez.  Jimena theodore procedure sched on 11/22 @ 8 am & pt to arrive at 7:30 am.

## 2017-11-17 NOTE — TELEPHONE ENCOUNTER
Pts daughter Sue Young states that Dr. Martir Matthews told her if she hadn't heard from this office by 2:30pm  re: scheduling thoracentesis that she should call. Please call.

## 2017-11-17 NOTE — PROGRESS NOTES
Referring Physician  C. Stephani Boas, MD    History of Present Illness  Lamar Gee is a 49-year-old female who presents to pulmonary clinic for follow-up visit.   Per daughter, she admits to worsening dyspnea with exertion and lower extremity edema over the cour every 6 (six) hours as needed for Pain. Disp: 100 tablet Rfl: 0   POTASSIUM CHLORIDE ER 20 MEQ Oral Tab CR TAKE 1 TABLET BY MOUTH DAILY Disp: 90 tablet Rfl: 0   furosemide 40 MG Oral Tab Take 1 tablet (40 mg total) by mouth 2 (two) times daily.  Disp: 180 t lymphadenopathy     Imaging  Chest x-ray on 11/6/2017 with evidence of moderate to large left pleural effusion seen    Assessment  1. Dyspnea with exertion  2. Obstructive sleep apnea  3. Chronic diastolic heart failure  4. Obesity  5.   Mild restrictiv

## 2017-11-17 NOTE — TELEPHONE ENCOUNTER
Notified pt's daughter of Thoracentesis instructions including to stop Xarelto 2 days prior to procedure. She was given procedure date & time as well as arrival time. Sary Swanson verbalized understanding. Dr. Chaim Gonzales.

## 2017-11-21 NOTE — TELEPHONE ENCOUNTER
Pts daughter is requesting a call back from RN or [facility] regarding procedure pt is having tomorrow. Daughter would like for pt to be admitted after or before procedure to be monitored.  Please call [phone] Allison oconnor

## 2017-11-21 NOTE — TELEPHONE ENCOUNTER
Dr. Rodriguez [facility]- pls call pt's daughter to further explain procedure. Thank you.

## 2017-11-22 NOTE — PROCEDURES
Left ultrasound guided thoracentesis    Ultrasound used to visualized moderate sized left pleural effusion with no evidence of  loculations seen and area of entry marked. Patient prepped and draped in sterile fashion.   10 cc of 1% lidocaine used to [employer]

## 2017-11-22 NOTE — IMAGING NOTE
Pt presents for left lung thoracentesis. HX OF AFIB, CHF, PE, COPD, PNEUMONIA, PERICARDIAL EFFUSION. KJPT ON XARELTO, (WAS TOLD TO HOLD FOR X 2 DAYS PRIOR TO TODAYS PROCEDURE). PTS CXR REVEALED A LARGE LEFT PLEURAL EFFUSION.  (HAD A PRODUCTIVE COUGH AND AWAINTING LAB DRAW.     1251: PT TAKEN TO RADIOLOGY HOLDING AWAITING SERUM LDH AND PROTEIN. DAUGHTER PRESENT WITH PT.    1786: blood draw completed. Pt discharged after discharge instruttons s/p thora and cxr given to pt and daughter.

## 2017-11-27 NOTE — PROGRESS NOTES
Spoke to Lul Ask at length about CCM, HIPAA verified, current care plan and performed CCM assessment.    Patient Active Problem List:     Hyperglycemia     Azotemia     Atrial fibrillation, new onset (HCC)     Elevated troponin     Acute on chronic congestive appointments     Date & Time Appointment Department [facility])    Jan 19, 2018 10:30 AM CST Follow Up Visit with Anival Whittaker MD [facility], [facility], [address], [place] ([place] [address])        [address], [address]

## 2017-11-27 NOTE — TELEPHONE ENCOUNTER
Dr. Jacque Maldonado-      During CCM outreach call dtr states that pt has weeping of wounds on her LLE. Dtr states she would like for pt to see PCP for evaluation. Scheduled for 11/28 at 2 pm with Dr. Jacque Maldonado. Dtr states that they started when pt had LE Edema.   Pt ha

## 2017-11-28 NOTE — PROGRESS NOTES
HPI:    Patient ID: Libby Ledesma is a 80year old female. History provided by patient's daughter. Pt is in a wheelchair today. She also has a cane. HPI     Wound of left lower extremity  Pt c/o acute left lower leg swelling and erythema.  There are als MG Oral Tab Take 0.5 tablets (40 mg total) by mouth daily. Disp: 90 tablet Rfl: 3   furosemide 40 MG Oral Tab Take 1 tablet (40 mg total) by mouth 2 (two) times daily.  Disp: 180 tablet Rfl: 0   methimazole 10 MG Oral Tab Take 1 tablet (10 mg total) by mout as needed. Disp:  Rfl: 3   predniSONE 20 MG Oral Tab Take 1 tablet (20 mg total) by mouth daily. Disp: 12 tablet Rfl: 0   simvastatin 5 MG Oral Tab Take 20 mg by mouth nightly. Disp:  Rfl:    magnesium 250 MG Oral Tab Take 250 mg by mouth daily.    Disp:  R valsartan 80 MG Oral Tab 90 tablet 3      Sig: Take 0.5 tablets (40 mg total) by mouth daily. furosemide 40 MG Oral Tab 180 tablet 0      Sig: Take 1 tablet (40 mg total) by mouth 2 (two) times daily.       methimazole 10 MG Oral Tab 90 tablet 3

## 2017-11-29 NOTE — TELEPHONE ENCOUNTER
Daughter calling states insurance is not covering med. Pt is taking med twice daily since being in the hospital. Daughter states pt will need rx for twice daily, rx at pharmacy is incorrect. Pt is out of meds early and can not refill.        Current Outpati

## 2017-11-29 NOTE — TELEPHONE ENCOUNTER
See note below. LOV & LR: 11/28/17 , script pended as BID. Previous script from yesterday listed below. Please advise.     Medication Detail     Medication Quantity Refills Start End   Potassium Chloride ER 20 MEQ Oral Tab CR 90 tablet 0 11/28/2017    Sig

## 2017-11-30 NOTE — TELEPHONE ENCOUNTER
Verified with daughter that Pt has been taking Potassium twice a day. Ok to resend Potassium ER 20mEq tablets BID?  Please advise    Please respond to pool: IWONA AVILES LPN/MAGDALENE

## 2017-12-05 NOTE — PROGRESS NOTES
Chart reviewed,  Call to pt, Elza Cooley. ([id number]).  name and number provided for further follow up. Time Spent This Encounter Total: 4 min medical record review, telephone,  Communication.   Monthly Minute Total including today: 4

## 2017-12-05 NOTE — PROGRESS NOTES
Spoke to Jamil Sheehan at length about CCM, HIPAA verified, current care plan and performed CCM assessment.     Patient Active Problem List:     Hyperglycemia     Azotemia     Atrial fibrillation, new onset (HCC)     Elevated troponin     Acute on chronic congestive [place] ([place])        [facility]  [place] [address]  [address]  [address]

## 2017-12-08 NOTE — PROGRESS NOTES
Subjective    Chief Complaint  This information was obtained from the patient  The patient is new to the [address] [facility] here for an initial visit for the evaluation and management of non-healing wound(s).     Allergies  penicillin    HPI  This information was Respiratory: Shortness of Breath (at rest and upon ambulation), Cough  Cardiovascular (Central/Peripheral): Edema (BLE)  Musculoskeletal: Assistive Devices (Walker/wheelchair), Joint Pain (Bilateral knee surgery, HX: arthritis per daughter), Decreased Acti Total score[de-identified] 0    Medications  gabapentin 100 mg capsule oral capsule oral take at bedtime  meclizine 12.5 mg tablet oral tablet oral three times daily as needed  simvastatin 5 mg tablet oral tablet oral take at bedtime 20mg  valsartan 80 mg tablet oral t respiration easy and regular. decreased breath sounds HX COPD. Cardiovascular:  Heart rate irregular . patient has AFib. Integumentary (Hair, Skin)  LLE wounds.      Wound #1 Left, Anterior Lower Leg is an acute Partial Thickness Lymphedema and has Left Extremity: Edema is present  Compression Device In Use: No  Calf Measurement 30 cm from heel with left measurement of 43 cm  Ankle Measurement 10 cm from heel with left measurement of 25 cm  Foot Measurement 10 cm from great toe with left measurement Patient seen for initial eval with KIRSTEN Sow. Patient to follow up twice weekly in clinic and schedule appt to see Hollie Loza with [facility] for compression garments. Daughter is with patient and translates.  Educated daughter and patient on compress Cover dressing and wrap with juwan. Do not put tape directly on the skin. Other: - xeroform  Change dressing two times per week. Compression Therapy:  Stockinette 4\"  Artiflex 10 cm  Artiflex 15 cm  Comprilan 8 cm. Comprilan 10 cm.      Wound #2 Lef Wound #2 (Left, Medial Lower Leg)  . Wound Treatment Note  Assessed patient’s pain status and effectiveness of pain management plan. Limb cleansed using Soap and water (1)  Applied Primary Wound Dressing.  using Xeroform 2x2 (1)  Applied Secondary Wound Isael

## 2017-12-11 NOTE — PROGRESS NOTES
Subjective    Chief Complaint  This information was obtained from the patient  The patient is new to the [address] [facility] here for an initial visit for the evaluation and management of non-healing wound(s). 12/11/17 no concerns for todays visit.     Allergies been noted. No undermining has been noted. There was no drainage noted. The patient reports a wound pain of level 0/10. The wound margin is flat and intact. Wound bed has % epithelialization, no granulation; no slough and no eschar present.    The per Signature(s): Date(s):  Treatment Notes Summary  Wound #1 (Left, Anterior Lower Leg)  . Wound Treatment Note  Assessed patient’s pain status and effectiveness of pain management plan.   Limb cleansed using Soap and water (1)  Applied Primary Wound Dressing

## 2017-12-13 NOTE — TELEPHONE ENCOUNTER
Action Requested: Summary for Provider     []  Critical Lab, Recommendations Needed  [] Need Additional Advice  [x]   FYI    []   Need Orders  [] Need Medications Sent to Pharmacy  []  Other     SUMMARY: Patient having shortness of breath and weakness; pul

## 2017-12-15 NOTE — TELEPHONE ENCOUNTER
Pt's daughter states pt was admitted to [facility]. Daughter states she informed Dr. Emily Hurt yesterday. Pt is going to have procedure to remove water from lungs today. Also is having wound care done while she is hospitalized.

## 2018-01-01 ENCOUNTER — APPOINTMENT (OUTPATIENT)
Dept: CV DIAGNOSTICS | Facility: HOSPITAL | Age: 83
DRG: 291 | End: 2018-01-01
Attending: INTERNAL MEDICINE
Payer: MEDICARE

## 2018-01-01 ENCOUNTER — MYAURORA ACCOUNT LINK (OUTPATIENT)
Dept: OTHER | Age: 83
End: 2018-01-01

## 2018-01-01 ENCOUNTER — SNF/IP PROF CHARGE ONLY (OUTPATIENT)
Dept: INTERNAL MEDICINE CLINIC | Facility: CLINIC | Age: 83
End: 2018-01-01

## 2018-01-01 ENCOUNTER — NURSE TRIAGE (OUTPATIENT)
Dept: OTHER | Age: 83
End: 2018-01-01

## 2018-01-01 ENCOUNTER — OFFICE VISIT (OUTPATIENT)
Dept: INTERNAL MEDICINE CLINIC | Facility: CLINIC | Age: 83
End: 2018-01-01

## 2018-01-01 ENCOUNTER — OFFICE VISIT (OUTPATIENT)
Dept: DERMATOLOGY CLINIC | Facility: CLINIC | Age: 83
End: 2018-01-01

## 2018-01-01 ENCOUNTER — APPOINTMENT (OUTPATIENT)
Dept: WOUND CARE | Facility: HOSPITAL | Age: 83
End: 2018-01-01
Payer: MEDICARE

## 2018-01-01 ENCOUNTER — TELEPHONE (OUTPATIENT)
Dept: OTHER | Age: 83
End: 2018-01-01

## 2018-01-01 ENCOUNTER — HOSPITAL ENCOUNTER (OUTPATIENT)
Dept: GENERAL RADIOLOGY | Age: 83
Discharge: HOME | End: 2018-01-01
Attending: INTERNAL MEDICINE | Admitting: INTERNAL MEDICINE
Payer: MEDICARE

## 2018-01-01 ENCOUNTER — PATIENT OUTREACH (OUTPATIENT)
Dept: CASE MANAGEMENT | Age: 83
End: 2018-01-01

## 2018-01-01 ENCOUNTER — HOSPITAL ENCOUNTER (INPATIENT)
Facility: HOSPITAL | Age: 83
LOS: 1 days | DRG: 291 | End: 2018-01-01
Attending: HOSPITALIST | Admitting: HOSPITALIST
Payer: OTHER MISCELLANEOUS

## 2018-01-01 ENCOUNTER — PRIOR ORIGINAL RECORDS (OUTPATIENT)
Dept: OTHER | Age: 83
End: 2018-01-01

## 2018-01-01 ENCOUNTER — TELEPHONE (OUTPATIENT)
Dept: INTERNAL MEDICINE CLINIC | Facility: CLINIC | Age: 83
End: 2018-01-01

## 2018-01-01 ENCOUNTER — APPOINTMENT (OUTPATIENT)
Dept: GENERAL RADIOLOGY | Facility: HOSPITAL | Age: 83
DRG: 291 | End: 2018-01-01
Attending: EMERGENCY MEDICINE
Payer: MEDICARE

## 2018-01-01 ENCOUNTER — APPOINTMENT (OUTPATIENT)
Dept: CT IMAGING | Facility: HOSPITAL | Age: 83
DRG: 291 | End: 2018-01-01
Attending: INTERNAL MEDICINE
Payer: MEDICARE

## 2018-01-01 ENCOUNTER — LAB ENCOUNTER (OUTPATIENT)
Dept: LAB | Facility: HOSPITAL | Age: 83
End: 2018-01-01
Attending: INTERNAL MEDICINE
Payer: MEDICARE

## 2018-01-01 ENCOUNTER — OFFICE VISIT (OUTPATIENT)
Dept: INTERNAL MEDICINE CLINIC | Facility: CLINIC | Age: 83
End: 2018-01-01
Payer: MEDICARE

## 2018-01-01 ENCOUNTER — APPOINTMENT (OUTPATIENT)
Dept: CT IMAGING | Facility: HOSPITAL | Age: 83
DRG: 291 | End: 2018-01-01
Attending: EMERGENCY MEDICINE
Payer: MEDICARE

## 2018-01-01 ENCOUNTER — HOSPITAL ENCOUNTER (INPATIENT)
Facility: HOSPITAL | Age: 83
LOS: 1 days | Discharge: HOSPICE | DRG: 291 | End: 2018-01-01
Attending: EMERGENCY MEDICINE | Admitting: INTERNAL MEDICINE
Payer: MEDICARE

## 2018-01-01 ENCOUNTER — APPOINTMENT (OUTPATIENT)
Dept: LAB | Age: 83
End: 2018-01-01
Attending: INTERNAL MEDICINE
Payer: MEDICARE

## 2018-01-01 ENCOUNTER — HOSPITAL ENCOUNTER (OUTPATIENT)
Dept: GENERAL RADIOLOGY | Age: 83
Discharge: HOME | End: 2018-01-01
Attending: INTERNAL MEDICINE
Payer: MEDICARE

## 2018-01-01 ENCOUNTER — APPOINTMENT (OUTPATIENT)
Dept: GENERAL RADIOLOGY | Facility: HOSPITAL | Age: 83
DRG: 291 | End: 2018-01-01
Attending: INTERNAL MEDICINE
Payer: MEDICARE

## 2018-01-01 ENCOUNTER — NURSE TRIAGE (OUTPATIENT)
Dept: INTERNAL MEDICINE CLINIC | Facility: CLINIC | Age: 83
End: 2018-01-01

## 2018-01-01 VITALS
BODY MASS INDEX: 43 KG/M2 | WEIGHT: 219 LBS | HEART RATE: 97 BPM | SYSTOLIC BLOOD PRESSURE: 108 MMHG | DIASTOLIC BLOOD PRESSURE: 73 MMHG

## 2018-01-01 VITALS
WEIGHT: 225.38 LBS | DIASTOLIC BLOOD PRESSURE: 68 MMHG | SYSTOLIC BLOOD PRESSURE: 102 MMHG | HEART RATE: 81 BPM | TEMPERATURE: 97 F | OXYGEN SATURATION: 97 % | BODY MASS INDEX: 44 KG/M2

## 2018-01-01 VITALS
TEMPERATURE: 96 F | SYSTOLIC BLOOD PRESSURE: 113 MMHG | DIASTOLIC BLOOD PRESSURE: 75 MMHG | WEIGHT: 220.63 LBS | HEART RATE: 83 BPM | BODY MASS INDEX: 43 KG/M2

## 2018-01-01 VITALS
WEIGHT: 216 LBS | SYSTOLIC BLOOD PRESSURE: 108 MMHG | HEART RATE: 88 BPM | BODY MASS INDEX: 42 KG/M2 | OXYGEN SATURATION: 100 % | DIASTOLIC BLOOD PRESSURE: 65 MMHG

## 2018-01-01 VITALS
OXYGEN SATURATION: 81 % | DIASTOLIC BLOOD PRESSURE: 26 MMHG | RESPIRATION RATE: 24 BRPM | HEART RATE: 133 BPM | TEMPERATURE: 98 F | SYSTOLIC BLOOD PRESSURE: 57 MMHG

## 2018-01-01 VITALS
WEIGHT: 216 LBS | HEART RATE: 94 BPM | HEIGHT: 60 IN | RESPIRATION RATE: 16 BRPM | DIASTOLIC BLOOD PRESSURE: 65 MMHG | BODY MASS INDEX: 42.41 KG/M2 | SYSTOLIC BLOOD PRESSURE: 130 MMHG | TEMPERATURE: 96 F

## 2018-01-01 VITALS
WEIGHT: 218.13 LBS | TEMPERATURE: 97 F | BODY MASS INDEX: 43 KG/M2 | DIASTOLIC BLOOD PRESSURE: 73 MMHG | HEART RATE: 96 BPM | SYSTOLIC BLOOD PRESSURE: 109 MMHG

## 2018-01-01 VITALS
SYSTOLIC BLOOD PRESSURE: 127 MMHG | BODY MASS INDEX: 43 KG/M2 | WEIGHT: 217.69 LBS | OXYGEN SATURATION: 94 % | DIASTOLIC BLOOD PRESSURE: 75 MMHG | HEART RATE: 105 BPM | TEMPERATURE: 97 F | RESPIRATION RATE: 24 BRPM

## 2018-01-01 DIAGNOSIS — R06.00 DYSPNEA: Primary | ICD-10-CM

## 2018-01-01 DIAGNOSIS — M54.50 ACUTE RIGHT-SIDED LOW BACK PAIN WITHOUT SCIATICA: ICD-10-CM

## 2018-01-01 DIAGNOSIS — R79.89 AZOTEMIA: Primary | ICD-10-CM

## 2018-01-01 DIAGNOSIS — J90 LARGE PLEURAL EFFUSION: Primary | ICD-10-CM

## 2018-01-01 DIAGNOSIS — J44.1 COPD EXACERBATION (HCC): ICD-10-CM

## 2018-01-01 DIAGNOSIS — I50.9 ACUTE ON CHRONIC CONGESTIVE HEART FAILURE, UNSPECIFIED HEART FAILURE TYPE (HCC): ICD-10-CM

## 2018-01-01 DIAGNOSIS — I48.19 ATRIAL FIBRILLATION, PERSISTENT (HCC): Chronic | ICD-10-CM

## 2018-01-01 DIAGNOSIS — G47.33 OSA TREATED WITH BIPAP: ICD-10-CM

## 2018-01-01 DIAGNOSIS — I50.9 ACUTE ON CHRONIC CONGESTIVE HEART FAILURE, UNSPECIFIED CONGESTIVE HEART FAILURE TYPE: ICD-10-CM

## 2018-01-01 DIAGNOSIS — I48.91 ATRIAL FIBRILLATION, NEW ONSET (HCC): ICD-10-CM

## 2018-01-01 DIAGNOSIS — I95.9 HYPOTENSION, UNSPECIFIED HYPOTENSION TYPE: ICD-10-CM

## 2018-01-01 DIAGNOSIS — I48.19 ATRIAL FIBRILLATION, PERSISTENT (HCC): ICD-10-CM

## 2018-01-01 DIAGNOSIS — Z23 NEED FOR INFLUENZA VACCINATION: Primary | ICD-10-CM

## 2018-01-01 DIAGNOSIS — R79.89 AZOTEMIA: ICD-10-CM

## 2018-01-01 DIAGNOSIS — I48.91 ATRIAL FIBRILLATION, NEW ONSET (HCC): Primary | ICD-10-CM

## 2018-01-01 DIAGNOSIS — L30.9 DERMATITIS: Primary | ICD-10-CM

## 2018-01-01 DIAGNOSIS — I87.2 VENOUS STASIS DERMATITIS OF BOTH LOWER EXTREMITIES: ICD-10-CM

## 2018-01-01 DIAGNOSIS — I50.23 ACUTE ON CHRONIC SYSTOLIC CONGESTIVE HEART FAILURE (HCC): ICD-10-CM

## 2018-01-01 DIAGNOSIS — N17.9 ACUTE KIDNEY INJURY (HCC): ICD-10-CM

## 2018-01-01 DIAGNOSIS — R23.8 SKIN IRRITATION: Primary | ICD-10-CM

## 2018-01-01 DIAGNOSIS — I50.9 ACUTE ON CHRONIC CONGESTIVE HEART FAILURE, UNSPECIFIED HEART FAILURE TYPE (HCC): Primary | ICD-10-CM

## 2018-01-01 DIAGNOSIS — M54.50 ACUTE RIGHT-SIDED LOW BACK PAIN WITHOUT SCIATICA: Primary | ICD-10-CM

## 2018-01-01 LAB
ANION GAP SERPL CALC-SCNC: 12 MMOL/L (ref 0–18)
ANION GAP SERPL CALC-SCNC: 12 MMOL/L (ref 0–18)
BASOPHILS # BLD: 0 K/UL (ref 0–0.2)
BASOPHILS NFR BLD: 0 %
BNP SERPL-MCNC: 448 PG/ML (ref 0–100)
BNP: 448 PMOL/L
BUN SERPL-MCNC: 32 MG/DL (ref 8–20)
BUN SERPL-MCNC: 53 MG/DL (ref 8–20)
BUN/CREAT SERPL: 28.8 (ref 10–20)
BUN/CREAT SERPL: 39 (ref 10–20)
BUN: 32 MG/DL
BUN: 53 MG/DL
CALCIUM SERPL-MCNC: 9.6 MG/DL (ref 8.5–10.5)
CALCIUM SERPL-MCNC: 9.6 MG/DL (ref 8.5–10.5)
CALCIUM: 9.6 MG/DL
CALCIUM: 9.6 MG/DL
CHLORIDE SERPL-SCNC: 91 MMOL/L (ref 95–110)
CHLORIDE SERPL-SCNC: 94 MMOL/L (ref 95–110)
CHLORIDE: 91 MEQ/L
CHLORIDE: 94 MEQ/L
CO2 SERPL-SCNC: 29 MMOL/L (ref 22–32)
CO2 SERPL-SCNC: 31 MMOL/L (ref 22–32)
CREAT SERPL-MCNC: 1.11 MG/DL (ref 0.5–1.5)
CREAT SERPL-MCNC: 1.36 MG/DL (ref 0.5–1.5)
CREATININE, SERUM: 1.11 MG/DL
CREATININE, SERUM: 1.36 MG/DL
EOSINOPHIL # BLD: 0.1 K/UL (ref 0–0.7)
EOSINOPHIL NFR BLD: 1 %
ERYTHROCYTE [DISTWIDTH] IN BLOOD BY AUTOMATED COUNT: 14.6 % (ref 11–15)
GLUCOSE SERPL-MCNC: 115 MG/DL (ref 70–99)
GLUCOSE SERPL-MCNC: 203 MG/DL (ref 70–99)
GLUCOSE: 115 MG/DL
GLUCOSE: 203 MG/DL
HCT VFR BLD AUTO: 42.7 % (ref 35–48)
HEMATOCRIT: 42.7 %
HEMOGLOBIN: 14.2 G/DL
HGB BLD-MCNC: 14.2 G/DL (ref 12–16)
LYMPHOCYTES # BLD: 0.7 K/UL (ref 1–4)
LYMPHOCYTES NFR BLD: 8 %
MCH RBC QN AUTO: 34.7 PG (ref 27–32)
MCHC RBC AUTO-ENTMCNC: 33.2 G/DL (ref 32–37)
MCV RBC AUTO: 104.4 FL (ref 80–100)
MONOCYTES # BLD: 0.5 K/UL (ref 0–1)
MONOCYTES NFR BLD: 5 %
NEUTROPHILS # BLD AUTO: 8 K/UL (ref 1.8–7.7)
NEUTROPHILS NFR BLD: 86 %
OSMOLALITY UR CALC.SUM OF ELEC: 291 MOSM/KG (ref 275–295)
OSMOLALITY UR CALC.SUM OF ELEC: 295 MOSM/KG (ref 275–295)
PLATELET # BLD AUTO: 186 K/UL (ref 140–400)
PLATELETS: 186 K/UL
PMV BLD AUTO: 9.6 FL (ref 7.4–10.3)
POTASSIUM SERPL-SCNC: 4.3 MMOL/L (ref 3.3–5.1)
POTASSIUM SERPL-SCNC: 4.4 MMOL/L (ref 3.3–5.1)
POTASSIUM, SERUM: 4.3 MEQ/L
POTASSIUM, SERUM: 4.4 MEQ/L
RBC # BLD AUTO: 4.09 M/UL (ref 3.7–5.4)
RED BLOOD COUNT: 4.09 X 10-6/U
SODIUM SERPL-SCNC: 134 MMOL/L (ref 136–144)
SODIUM SERPL-SCNC: 135 MMOL/L (ref 136–144)
SODIUM: 134 MEQ/L
SODIUM: 135 MEQ/L
WBC # BLD AUTO: 9.4 K/UL (ref 4–11)
WHITE BLOOD COUNT: 9.4 X 10-3/U

## 2018-01-01 PROCEDURE — G0463 HOSPITAL OUTPT CLINIC VISIT: HCPCS | Performed by: INTERNAL MEDICINE

## 2018-01-01 PROCEDURE — 99213 OFFICE O/P EST LOW 20 MIN: CPT | Performed by: INTERNAL MEDICINE

## 2018-01-01 PROCEDURE — 85025 COMPLETE CBC W/AUTO DIFF WBC: CPT

## 2018-01-01 PROCEDURE — 70450 CT HEAD/BRAIN W/O DYE: CPT | Performed by: EMERGENCY MEDICINE

## 2018-01-01 PROCEDURE — G0008 ADMIN INFLUENZA VIRUS VAC: HCPCS | Performed by: INTERNAL MEDICINE

## 2018-01-01 PROCEDURE — 99490 CHRNC CARE MGMT STAFF 1ST 20: CPT

## 2018-01-01 PROCEDURE — 73502 X-RAY EXAM HIP UNI 2-3 VIEWS: CPT | Performed by: INTERNAL MEDICINE

## 2018-01-01 PROCEDURE — 83880 ASSAY OF NATRIURETIC PEPTIDE: CPT

## 2018-01-01 PROCEDURE — 36415 COLL VENOUS BLD VENIPUNCTURE: CPT

## 2018-01-01 PROCEDURE — 99305 1ST NF CARE MODERATE MDM 35: CPT | Performed by: INTERNAL MEDICINE

## 2018-01-01 PROCEDURE — G0463 HOSPITAL OUTPT CLINIC VISIT: HCPCS | Performed by: DERMATOLOGY

## 2018-01-01 PROCEDURE — 80048 BASIC METABOLIC PNL TOTAL CA: CPT

## 2018-01-01 PROCEDURE — 99214 OFFICE O/P EST MOD 30 MIN: CPT | Performed by: INTERNAL MEDICINE

## 2018-01-01 PROCEDURE — 71045 X-RAY EXAM CHEST 1 VIEW: CPT | Performed by: EMERGENCY MEDICINE

## 2018-01-01 PROCEDURE — 93307 TTE W/O DOPPLER COMPLETE: CPT | Performed by: INTERNAL MEDICINE

## 2018-01-01 PROCEDURE — 99291 CRITICAL CARE FIRST HOUR: CPT | Performed by: INTERNAL MEDICINE

## 2018-01-01 PROCEDURE — 71045 X-RAY EXAM CHEST 1 VIEW: CPT | Performed by: INTERNAL MEDICINE

## 2018-01-01 PROCEDURE — 99233 SBSQ HOSP IP/OBS HIGH 50: CPT | Performed by: INTERNAL MEDICINE

## 2018-01-01 PROCEDURE — 71250 CT THORAX DX C-: CPT | Performed by: INTERNAL MEDICINE

## 2018-01-01 PROCEDURE — 99202 OFFICE O/P NEW SF 15 MIN: CPT | Performed by: DERMATOLOGY

## 2018-01-01 PROCEDURE — 99233 SBSQ HOSP IP/OBS HIGH 50: CPT | Performed by: HOSPITALIST

## 2018-01-01 PROCEDURE — 72110 X-RAY EXAM L-2 SPINE 4/>VWS: CPT | Performed by: INTERNAL MEDICINE

## 2018-01-01 PROCEDURE — 90653 IIV ADJUVANT VACCINE IM: CPT | Performed by: INTERNAL MEDICINE

## 2018-01-01 RX ORDER — MULTIVITAMIN WITH IRON
250 TABLET ORAL DAILY
Status: ON HOLD | COMMUNITY
End: 2018-01-01

## 2018-01-01 RX ORDER — CALCIUM CARBONATE 500(1250)
500 TABLET ORAL DAILY
Status: DISCONTINUED | OUTPATIENT
Start: 2018-01-01 | End: 2018-01-01

## 2018-01-01 RX ORDER — BISACODYL 10 MG
10 SUPPOSITORY, RECTAL RECTAL
Status: DISCONTINUED | OUTPATIENT
Start: 2018-01-01 | End: 2018-01-01

## 2018-01-01 RX ORDER — METOPROLOL TARTRATE 5 MG/5ML
5 INJECTION INTRAVENOUS EVERY 6 HOURS PRN
Status: DISCONTINUED | OUTPATIENT
Start: 2018-01-01 | End: 2018-01-01

## 2018-01-01 RX ORDER — POTASSIUM CHLORIDE 20 MEQ/1
20 TABLET, EXTENDED RELEASE ORAL DAILY
Status: ON HOLD | COMMUNITY
End: 2018-01-01

## 2018-01-01 RX ORDER — METHIMAZOLE 10 MG/1
1 TABLET ORAL DAILY
Refills: 2 | Status: ON HOLD | COMMUNITY
Start: 2018-01-01 | End: 2018-01-01

## 2018-01-01 RX ORDER — SPIRONOLACTONE 25 MG/1
12.5 TABLET ORAL 3 TIMES DAILY
Qty: 45 TABLET | Refills: 2 | Status: SHIPPED | OUTPATIENT
Start: 2018-01-01 | End: 2018-01-01

## 2018-01-01 RX ORDER — METOLAZONE 2.5 MG/1
TABLET ORAL
Qty: 90 TABLET | Refills: 0 | Status: ON HOLD | OUTPATIENT
Start: 2018-01-01 | End: 2018-01-01

## 2018-01-01 RX ORDER — IPRATROPIUM BROMIDE AND ALBUTEROL SULFATE 2.5; .5 MG/3ML; MG/3ML
3 SOLUTION RESPIRATORY (INHALATION)
Status: DISCONTINUED | OUTPATIENT
Start: 2018-01-01 | End: 2018-01-01

## 2018-01-01 RX ORDER — CIPROFLOXACIN 250 MG/1
250 TABLET, FILM COATED ORAL 2 TIMES DAILY
Qty: 20 TABLET | Refills: 0 | Status: SHIPPED | OUTPATIENT
Start: 2018-01-01 | End: 2018-01-01

## 2018-01-01 RX ORDER — IPRATROPIUM BROMIDE AND ALBUTEROL SULFATE 2.5; .5 MG/3ML; MG/3ML
3 SOLUTION RESPIRATORY (INHALATION) EVERY 6 HOURS PRN
Status: DISCONTINUED | OUTPATIENT
Start: 2018-01-01 | End: 2018-01-01

## 2018-01-01 RX ORDER — SIMVASTATIN 5 MG
TABLET ORAL
Qty: 90 TABLET | Refills: 0 | Status: SHIPPED | OUTPATIENT
Start: 2018-01-01 | End: 2018-01-01

## 2018-01-01 RX ORDER — SODIUM CHLORIDE 9 MG/ML
125 INJECTION, SOLUTION INTRAVENOUS CONTINUOUS
Status: DISCONTINUED | OUTPATIENT
Start: 2018-01-01 | End: 2018-01-01

## 2018-01-01 RX ORDER — OMEPRAZOLE 40 MG/1
40 CAPSULE, DELAYED RELEASE ORAL
Qty: 90 CAPSULE | Refills: 0 | Status: ON HOLD | OUTPATIENT
Start: 2018-01-01 | End: 2018-01-01

## 2018-01-01 RX ORDER — LORAZEPAM 2 MG/ML
0.5 INJECTION INTRAMUSCULAR EVERY 4 HOURS PRN
Status: DISCONTINUED | OUTPATIENT
Start: 2018-01-01 | End: 2018-01-01

## 2018-01-01 RX ORDER — IPRATROPIUM BROMIDE AND ALBUTEROL SULFATE 2.5; .5 MG/3ML; MG/3ML
3 SOLUTION RESPIRATORY (INHALATION) 2 TIMES DAILY
Qty: 90 VIAL | Refills: 3 | Status: SHIPPED | OUTPATIENT
Start: 2018-01-01 | End: 2018-01-01

## 2018-01-01 RX ORDER — SIMVASTATIN 10 MG
10 TABLET ORAL NIGHTLY
Qty: 90 TABLET | Refills: 0 | Status: SHIPPED | OUTPATIENT
Start: 2018-01-01 | End: 2018-01-01 | Stop reason: DRUGHIGH

## 2018-01-01 RX ORDER — ACETAMINOPHEN 325 MG/1
650 TABLET ORAL EVERY 6 HOURS PRN
Status: ON HOLD | COMMUNITY
End: 2018-01-01

## 2018-01-01 RX ORDER — METHIMAZOLE 10 MG/1
TABLET ORAL
Qty: 90 TABLET | Refills: 0 | Status: ON HOLD | OUTPATIENT
Start: 2018-01-01 | End: 2018-01-01

## 2018-01-01 RX ORDER — CIPROFLOXACIN 250 MG/1
250 TABLET, FILM COATED ORAL 2 TIMES DAILY
Qty: 14 TABLET | Refills: 0 | Status: SHIPPED | OUTPATIENT
Start: 2018-01-01 | End: 2018-01-01 | Stop reason: ALTCHOICE

## 2018-01-01 RX ORDER — LORAZEPAM 2 MG/ML
1 INJECTION INTRAMUSCULAR EVERY 4 HOURS PRN
Status: DISCONTINUED | OUTPATIENT
Start: 2018-01-01 | End: 2018-01-01

## 2018-01-01 RX ORDER — SODIUM CHLORIDE 0.9 % (FLUSH) 0.9 %
10 SYRINGE (ML) INJECTION AS NEEDED
Status: DISCONTINUED | OUTPATIENT
Start: 2018-01-01 | End: 2018-01-01

## 2018-01-01 RX ORDER — METOPROLOL TARTRATE 50 MG/1
TABLET, FILM COATED ORAL
Qty: 180 TABLET | Refills: 0 | Status: ON HOLD | OUTPATIENT
Start: 2018-01-01 | End: 2018-01-01

## 2018-01-01 RX ORDER — RIVAROXABAN 15 MG/1
TABLET, FILM COATED ORAL
Qty: 90 TABLET | Refills: 0 | Status: ON HOLD | OUTPATIENT
Start: 2018-01-01 | End: 2018-01-01

## 2018-01-01 RX ORDER — METOLAZONE 2.5 MG/1
TABLET ORAL
Qty: 90 TABLET | Refills: 0 | Status: SHIPPED | OUTPATIENT
Start: 2018-01-01 | End: 2018-01-01

## 2018-01-01 RX ORDER — MORPHINE SULFATE 2 MG/ML
1 INJECTION, SOLUTION INTRAMUSCULAR; INTRAVENOUS
Status: DISCONTINUED | OUTPATIENT
Start: 2018-01-01 | End: 2018-01-01

## 2018-01-01 RX ORDER — PYRIDOXINE HCL (VITAMIN B6) 100 MG
65 TABLET ORAL DAILY
Status: ON HOLD | COMMUNITY
End: 2018-01-01

## 2018-01-01 RX ORDER — DOXYCYCLINE HYCLATE 100 MG/1
CAPSULE ORAL
Refills: 0 | COMMUNITY
Start: 2018-01-01 | End: 2018-01-01 | Stop reason: ALTCHOICE

## 2018-01-01 RX ORDER — RIVAROXABAN 15 MG/1
TABLET, FILM COATED ORAL
Qty: 90 TABLET | Refills: 0 | Status: SHIPPED | OUTPATIENT
Start: 2018-01-01 | End: 2018-01-01

## 2018-01-01 RX ORDER — SODIUM CHLORIDE 9 MG/ML
83 INJECTION, SOLUTION INTRAVENOUS CONTINUOUS
Status: DISCONTINUED | OUTPATIENT
Start: 2018-01-01 | End: 2018-01-01

## 2018-01-01 RX ORDER — RIVAROXABAN 15 MG/1
TABLET, FILM COATED ORAL
Refills: 0 | COMMUNITY
Start: 2018-01-01 | End: 2018-01-01

## 2018-01-01 RX ORDER — HYDROCODONE BITARTRATE AND ACETAMINOPHEN 5; 325 MG/1; MG/1
1 TABLET ORAL EVERY 6 HOURS PRN
Qty: 60 TABLET | Refills: 0 | Status: SHIPPED | OUTPATIENT
Start: 2018-01-01 | End: 2018-01-01

## 2018-01-01 RX ORDER — MELATONIN
800 DAILY
Status: DISCONTINUED | OUTPATIENT
Start: 2018-01-01 | End: 2018-01-01

## 2018-01-01 RX ORDER — IPRATROPIUM BROMIDE AND ALBUTEROL SULFATE 2.5; .5 MG/3ML; MG/3ML
SOLUTION RESPIRATORY (INHALATION)
Qty: 270 ML | Refills: 0 | Status: SHIPPED | OUTPATIENT
Start: 2018-01-01 | End: 2018-01-01

## 2018-01-01 RX ORDER — GLYCOPYRROLATE 0.2 MG/ML
0.4 INJECTION, SOLUTION INTRAMUSCULAR; INTRAVENOUS
Status: DISCONTINUED | OUTPATIENT
Start: 2018-01-01 | End: 2018-01-01

## 2018-01-01 RX ORDER — ATROPINE SULFATE 10 MG/ML
2 SOLUTION/ DROPS OPHTHALMIC EVERY 2 HOUR PRN
Status: DISCONTINUED | OUTPATIENT
Start: 2018-01-01 | End: 2018-01-01

## 2018-01-01 RX ORDER — HALOPERIDOL 5 MG/ML
2 INJECTION INTRAMUSCULAR
Status: DISCONTINUED | OUTPATIENT
Start: 2018-01-01 | End: 2018-01-01

## 2018-01-01 RX ORDER — SIMVASTATIN 5 MG
5 TABLET ORAL NIGHTLY
Status: ON HOLD | COMMUNITY
End: 2018-01-01

## 2018-01-01 RX ORDER — AMMONIUM LACTATE 12 G/100G
LOTION TOPICAL DAILY
COMMUNITY
End: 2018-01-01

## 2018-01-01 RX ORDER — GABAPENTIN 100 MG/1
CAPSULE ORAL
Qty: 90 CAPSULE | Refills: 0 | Status: SHIPPED | OUTPATIENT
Start: 2018-01-01 | End: 2018-01-01

## 2018-01-01 RX ORDER — SIMVASTATIN 5 MG
TABLET ORAL
Qty: 90 TABLET | Refills: 0 | Status: ON HOLD | OUTPATIENT
Start: 2018-01-01 | End: 2018-01-01

## 2018-01-01 RX ORDER — OMEPRAZOLE 40 MG/1
40 CAPSULE, DELAYED RELEASE ORAL
Qty: 90 CAPSULE | Refills: 0 | Status: SHIPPED | OUTPATIENT
Start: 2018-01-01 | End: 2018-01-01

## 2018-01-01 RX ORDER — METOPROLOL TARTRATE 50 MG/1
TABLET, FILM COATED ORAL
Qty: 180 TABLET | Refills: 0 | Status: SHIPPED | OUTPATIENT
Start: 2018-01-01 | End: 2018-01-01

## 2018-01-01 RX ORDER — METOPROLOL TARTRATE 50 MG/1
TABLET, FILM COATED ORAL
Refills: 0 | COMMUNITY
Start: 2018-01-01 | End: 2018-01-01

## 2018-01-01 RX ORDER — IPRATROPIUM BROMIDE AND ALBUTEROL SULFATE 2.5; .5 MG/3ML; MG/3ML
3 SOLUTION RESPIRATORY (INHALATION) 2 TIMES DAILY
COMMUNITY
End: 2018-01-01

## 2018-01-01 RX ORDER — IPRATROPIUM BROMIDE AND ALBUTEROL SULFATE 2.5; .5 MG/3ML; MG/3ML
3 SOLUTION RESPIRATORY (INHALATION) EVERY 4 HOURS PRN
Status: CANCELLED | OUTPATIENT
Start: 2018-01-01

## 2018-01-01 RX ORDER — GUAIFENESIN 600 MG
TABLET, EXTENDED RELEASE 12 HR ORAL 2 TIMES DAILY
Status: ON HOLD | COMMUNITY
End: 2018-01-01

## 2018-01-01 RX ORDER — SPIRONOLACTONE 25 MG/1
TABLET ORAL
Qty: 45 TABLET | Refills: 0 | Status: ON HOLD | OUTPATIENT
Start: 2018-01-01 | End: 2018-01-01

## 2018-01-01 RX ORDER — ACETAMINOPHEN 325 MG/1
650 TABLET ORAL EVERY 6 HOURS PRN
Status: DISCONTINUED | OUTPATIENT
Start: 2018-01-01 | End: 2018-01-01

## 2018-01-01 RX ORDER — 0.9 % SODIUM CHLORIDE 0.9 %
VIAL (ML) INJECTION
Status: COMPLETED
Start: 2018-01-01 | End: 2018-01-01

## 2018-01-01 RX ORDER — POTASSIUM CHLORIDE 20 MEQ/1
20 TABLET, EXTENDED RELEASE ORAL DAILY
Qty: 90 TABLET | Refills: 0 | Status: ON HOLD | OUTPATIENT
Start: 2018-01-01 | End: 2018-01-01

## 2018-01-01 RX ORDER — ONDANSETRON 2 MG/ML
4 INJECTION INTRAMUSCULAR; INTRAVENOUS EVERY 6 HOURS PRN
Status: DISCONTINUED | OUTPATIENT
Start: 2018-01-01 | End: 2018-01-01

## 2018-01-01 RX ORDER — HYDROCODONE BITARTRATE AND ACETAMINOPHEN 5; 325 MG/1; MG/1
1 TABLET ORAL EVERY 6 HOURS PRN
Qty: 60 TABLET | Refills: 0 | Status: ON HOLD | OUTPATIENT
Start: 2018-01-01 | End: 2018-01-01

## 2018-01-01 RX ORDER — PREDNISONE 10 MG/1
TABLET ORAL
Refills: 0 | Status: ON HOLD | COMMUNITY
Start: 2018-01-01 | End: 2018-01-01

## 2018-01-01 RX ORDER — CALCIUM CARBONATE 500(1250)
500 TABLET ORAL DAILY
Status: ON HOLD | COMMUNITY
End: 2018-01-01

## 2018-01-01 RX ORDER — AMMONIUM LACTATE 12 G/100G
1 CREAM TOPICAL EVERY EVENING
Qty: 1 TUBE | Refills: 5 | Status: ON HOLD | OUTPATIENT
Start: 2018-01-01 | End: 2018-01-01

## 2018-01-01 RX ORDER — GABAPENTIN 100 MG/1
CAPSULE ORAL
Qty: 90 CAPSULE | Refills: 0 | Status: ON HOLD | OUTPATIENT
Start: 2018-01-01 | End: 2018-01-01

## 2018-01-01 RX ORDER — METOLAZONE 2.5 MG/1
2.5 TABLET ORAL
COMMUNITY
End: 2018-01-01

## 2018-01-01 RX ORDER — MECLIZINE HYDROCHLORIDE 25 MG/1
25 TABLET ORAL 3 TIMES DAILY PRN
Status: ON HOLD | COMMUNITY
End: 2018-01-01

## 2018-01-01 RX ORDER — ACETYLCYSTEINE 200 MG/ML
2 SOLUTION ORAL; RESPIRATORY (INHALATION) 2 TIMES DAILY
Status: DISCONTINUED | OUTPATIENT
Start: 2018-01-01 | End: 2018-01-01

## 2018-01-01 RX ORDER — POLYVINYL ALCOHOL 14 MG/ML
2 SOLUTION/ DROPS OPHTHALMIC
Status: DISCONTINUED | OUTPATIENT
Start: 2018-01-01 | End: 2018-01-01

## 2018-01-01 RX ORDER — GABAPENTIN 100 MG/1
100 CAPSULE ORAL NIGHTLY
Qty: 90 CAPSULE | Refills: 0 | Status: SHIPPED | OUTPATIENT
Start: 2018-01-01 | End: 2018-01-01

## 2018-01-01 RX ORDER — IPRATROPIUM BROMIDE AND ALBUTEROL SULFATE 2.5; .5 MG/3ML; MG/3ML
3 SOLUTION RESPIRATORY (INHALATION) EVERY 4 HOURS PRN
Status: DISCONTINUED | OUTPATIENT
Start: 2018-01-01 | End: 2018-01-01

## 2018-01-01 RX ORDER — SIMVASTATIN 5 MG
10 TABLET ORAL NIGHTLY
Status: DISCONTINUED | OUTPATIENT
Start: 2018-01-01 | End: 2018-01-01

## 2018-01-01 RX ORDER — SIMVASTATIN 10 MG
TABLET ORAL
Refills: 0 | COMMUNITY
Start: 2018-01-01 | End: 2018-01-01

## 2018-01-01 RX ORDER — METOLAZONE 2.5 MG/1
2.5 TABLET ORAL DAILY
Qty: 90 TABLET | Refills: 0 | Status: SHIPPED | OUTPATIENT
Start: 2018-01-01 | End: 2018-01-01

## 2018-01-01 RX ORDER — CHOLECALCIFEROL (VITAMIN D3) 1250 MCG
50000 CAPSULE ORAL WEEKLY
Status: DISCONTINUED | OUTPATIENT
Start: 2018-01-01 | End: 2018-01-01

## 2018-01-01 RX ORDER — MELATONIN 10 MG
TABLET, SUBLINGUAL SUBLINGUAL
Status: ON HOLD | COMMUNITY
End: 2018-01-01

## 2018-01-01 RX ORDER — MAGNESIUM OXIDE 400 MG (241.3 MG MAGNESIUM) TABLET
400 TABLET DAILY
Status: ON HOLD | COMMUNITY
End: 2018-01-01

## 2018-01-01 RX ORDER — LORAZEPAM 2 MG/ML
2 INJECTION INTRAMUSCULAR EVERY 4 HOURS PRN
Status: DISCONTINUED | OUTPATIENT
Start: 2018-01-01 | End: 2018-01-01

## 2018-01-01 RX ORDER — GABAPENTIN 100 MG/1
100 CAPSULE ORAL 2 TIMES DAILY
Status: DISCONTINUED | OUTPATIENT
Start: 2018-01-01 | End: 2018-01-01

## 2018-01-01 RX ORDER — DILTIAZEM HYDROCHLORIDE 120 MG/1
120 TABLET, FILM COATED ORAL DAILY
Status: ON HOLD | COMMUNITY
End: 2018-01-01

## 2018-01-01 RX ORDER — VALSARTAN 40 MG/1
40 TABLET ORAL DAILY
Status: ON HOLD | COMMUNITY
End: 2018-01-01

## 2018-01-01 RX ORDER — OMEPRAZOLE 40 MG/1
CAPSULE, DELAYED RELEASE ORAL
Qty: 90 CAPSULE | Refills: 0 | OUTPATIENT
Start: 2018-01-01

## 2018-01-01 RX ORDER — TORSEMIDE 20 MG/1
20 TABLET ORAL 2 TIMES DAILY
Status: ON HOLD | COMMUNITY
End: 2018-01-01

## 2018-01-01 RX ORDER — METHIMAZOLE 10 MG/1
10 TABLET ORAL DAILY
Status: DISCONTINUED | OUTPATIENT
Start: 2018-01-01 | End: 2018-01-01

## 2018-01-01 RX ORDER — SPIRONOLACTONE 25 MG/1
12.5 TABLET ORAL 3 TIMES DAILY
COMMUNITY
End: 2018-01-01

## 2018-01-01 RX ORDER — HALOPERIDOL 5 MG/ML
1 INJECTION INTRAMUSCULAR
Status: DISCONTINUED | OUTPATIENT
Start: 2018-01-01 | End: 2018-01-01

## 2018-01-01 RX ORDER — SIMVASTATIN 5 MG
TABLET ORAL
Qty: 90 TABLET | Refills: 0 | Status: CANCELLED | OUTPATIENT
Start: 2018-01-01

## 2018-01-01 RX ORDER — SCOLOPAMINE TRANSDERMAL SYSTEM 1 MG/1
1 PATCH, EXTENDED RELEASE TRANSDERMAL
Status: DISCONTINUED | OUTPATIENT
Start: 2018-01-01 | End: 2018-01-01

## 2018-01-01 RX ORDER — METHYLPREDNISOLONE SODIUM SUCCINATE 40 MG/ML
40 INJECTION, POWDER, LYOPHILIZED, FOR SOLUTION INTRAMUSCULAR; INTRAVENOUS EVERY 12 HOURS
Status: DISCONTINUED | OUTPATIENT
Start: 2018-01-01 | End: 2018-01-01

## 2018-01-01 RX ORDER — SODIUM CHLORIDE 9 MG/ML
INJECTION, SOLUTION INTRAVENOUS
Status: COMPLETED
Start: 2018-01-01 | End: 2018-01-01

## 2018-01-01 RX ORDER — HYDROCODONE BITARTRATE AND ACETAMINOPHEN 5; 325 MG/1; MG/1
1 TABLET ORAL EVERY 6 HOURS PRN
COMMUNITY
End: 2018-01-01

## 2018-01-01 RX ORDER — PRAVASTATIN SODIUM 20 MG
20 TABLET ORAL NIGHTLY
Status: DISCONTINUED | OUTPATIENT
Start: 2018-01-01 | End: 2018-01-01

## 2018-01-01 RX ORDER — IPRATROPIUM BROMIDE AND ALBUTEROL SULFATE 2.5; .5 MG/3ML; MG/3ML
SOLUTION RESPIRATORY (INHALATION)
Qty: 270 ML | Refills: 0 | Status: ON HOLD | OUTPATIENT
Start: 2018-01-01 | End: 2018-01-01

## 2018-01-01 RX ORDER — ACETAMINOPHEN 650 MG/1
650 SUPPOSITORY RECTAL EVERY 4 HOURS PRN
Status: DISCONTINUED | OUTPATIENT
Start: 2018-01-01 | End: 2018-01-01

## 2018-01-01 RX ORDER — CHOLECALCIFEROL (VITAMIN D3) 1250 MCG
50000 CAPSULE ORAL WEEKLY
Status: ON HOLD | COMMUNITY
End: 2018-01-01

## 2018-01-03 NOTE — TELEPHONE ENCOUNTER
Wilmar from [facility] stated she received a 15 page fax yesterday from the office but dr. Pramod Prasad is very faint.  Wilmar asking to darken sig and re fax to [phone]

## 2018-01-08 NOTE — PROGRESS NOTES
Chart reviewed. Spoke with daughter. She indicates that pt had a hospitalization last month ([facility] was aware based on chart review).   Clair theodore pt is now in rehab for [facility] across the street from the hospital. She does not know when pt will be discharged but

## 2018-01-30 NOTE — PROGRESS NOTES
Spoke to Earl at length about CCM, HIPAA verified, current care plan and performed CCM assessment.   Patient Active Problem List:     Hyperglycemia     Azotemia     Atrial fibrillation, new onset (HCC)     Elevated troponin     Acute on chronic congestive h Lul Ask reports is stable on all medications and denies experiencing any side effects. Care Team: Reviewed and updated.     Your appointments     Date & Time Appointment Department [facility])    Feb 01, 2018 11:00 AM CST Follow Up Visit with Luis Miguel Miller

## 2018-02-01 NOTE — PROGRESS NOTES
HPI:    Patient ID: Moshe Cabello is a 80year old female. History provided by pt, daughter, and son. Pt is in a wheelchair. HPI    Azotemia  Chronic. Pertinent negatives include no chest pain.  Pt unaware of anything that makes the problem better or wors pressure    • High cholesterol    • Hyperlipidemia    • Sleep apnea       Past Surgical History:  No date: THORACENTESIS, ASPIRATN   History reviewed. No pertinent family history.    Smoking status: Never Smoker mg by mouth 2 (two) times daily.  ) Disp: 180 tablet Rfl: 3   Calcium Carbonate-Vitamin D 500-200 MG-UNIT Oral Tab Take 1 tablet by mouth daily. Disp:  Rfl:    gabapentin 100 MG Oral Cap Take 1 capsule (100 mg total) by mouth nightly.  Disp: 90 capsule Rfl: (R79.89) Azotemia  (primary encounter diagnosis)  -Chronic  Plan: BASIC METABOLIC PANEL (8)    (C29.29) Atrial fibrillation, new onset ([address]  -Chronic  -On exam, pt unremarkable  -Continue Xarelto 15 mg    (I50.9) Acute on chronic congestive heart failur

## 2018-02-07 NOTE — TELEPHONE ENCOUNTER
Daughter questioning if pt is to continue taking spironolactone 25 mg? Also questioning the dose.  Said on bottle instructions is for  pt to take 1-1/2 tablet daily but  hospital instructions indicated pt to take  12.5mg  3 times daily       If to contin

## 2018-02-08 NOTE — TELEPHONE ENCOUNTER
See Pt's daughter message and advise- RX not mentioned in notes at [address] [place] [address] 2/1/8

## 2018-02-09 NOTE — TELEPHONE ENCOUNTER
From med at 270 tabs ref x 1    Labs are fairly stable except kidney function has declined a bit, need renal profile in 1-2 weeks

## 2018-02-09 NOTE — TELEPHONE ENCOUNTER
Spoke with Darshan hudson pt's daughter and informed per Dr. Martir Rosenbaum continue Spironolactone 12.5 TID. Darshan hudson voiced understanding and would like refill sent to pharm pt only has about 2 days worth of meds.  I Informed Darshan hudson that  Is currently out of office but will

## 2018-02-15 NOTE — TELEPHONE ENCOUNTER
Pts daughter states Pt was prescribed a 1 noemi nebulizer twice a day by an outside prescriber and need a refill. Please advise, thank you.

## 2018-02-19 NOTE — PROGRESS NOTES
Spoke to daughter Cindy Goddard, HIPAA verified for CCM call.     Medical History  Patient Active Problem List:     Hyperglycemia     Azotemia     Atrial fibrillation, new onset (HCC)     Elevated troponin     Acute on chronic congestive heart failure, unspecified helpful with her breathing. · Erythemia ALEJA Legs-  dtr is monitoring and applying cream at hs. Dtr reports that legs are more red at night time but clearer in the morning. She and visiting nurse are monitoring.      · ALEJA Knee Pain- dtr stts that pt is

## 2018-02-19 NOTE — PROGRESS NOTES
Dtr believes flu shot was rec'd at [facility] in [place]. Call to them confirmed it was rec'd on 11/15/17, hi dose flu shot. EMR updated. Time Spent This Encounter Total: 3 min medical record review, telephone,  communication.        Monthly Minute Total i

## 2018-02-26 NOTE — PROGRESS NOTES
HPI:    Patient ID: Maryanne Clark is a 80year old female. Patient presents today with her daughter with complaint of redness of skin of both lower legs which had started today.  Per daughter, pt had been using lac-hydrin cream on both her legs for the p metolazone 2.5 MG Oral Tab Take 2.5 mg by mouth. Disp:  Rfl:    Potassium Chloride ER 20 MEQ Oral Tab CR Take 1 tablet (20 mEq total) by mouth 2 (two) times daily.  (Patient taking differently: Take 20 mEq by mouth daily.  ) Disp: 180 tablet Rfl: 0   furo Constitutional: She appears well-developed. She is cooperative. Non-toxic appearance. She does not have a sickly appearance. She does not appear ill. No distress.    obese   HENT:   Right Ear: External ear normal.   Left Ear: External ear normal.   Nose: encounter.       Meds This Visit:  No prescriptions requested or ordered in this encounter       Imaging & Referrals:  None       FS#[id number]

## 2018-02-26 NOTE — TELEPHONE ENCOUNTER
Tamra Jasso (daughter) canceled appt with Dr Lisbet Osuna 3/1/18 and rescheduled appt today with Dr Caden Carrillo for evaluation of bilateral lower leg itchiness/reddness.

## 2018-02-26 NOTE — TELEPHONE ENCOUNTER
Pharmacy is requesting refill for    Current Outpatient Prescriptions:  simvastatin 10 MG Oral Tab  Disp:  Rfl: 0   Metoprolol Tartrate 50 MG Oral Tab TK 1 T PO BID Disp:  Rfl: 0   XARELTO 15 MG Oral Tab TK 1 T PO MAYRA Disp:  Rfl: 0   gabapentin 100 MG Oral

## 2018-02-26 NOTE — TELEPHONE ENCOUNTER
Please advise on refill request.    Hypertensive Medications  Protocol Criteria:  · Appointment scheduled in the past 6 months or in the next 3 months  · BMP or CMP in the past 12 months  · Creatinine result < 2  Recent Outpatient Visits            Today extremity, initial encounter    [facility] KIRSTEN Tapia    Office Visit    3 months ago Wound of left lower extremity, initial encounter    Abe Smith MD    Office Visit    3 months

## 2018-02-26 NOTE — TELEPHONE ENCOUNTER
Braeden Rojas (daughter) requesting refills on following medications: simvastatin 10 mg, omeprazole 40 mg, metoprolol tartrate 50 mg, xarelto 15 mg, methmazole 10 mg.     Dr Jj Vivar,    Please advise on pended omeprazole 40 mg, metoprolol tartrate 40 mg, simvastatin 1

## 2018-02-26 NOTE — TELEPHONE ENCOUNTER
Action Requested: Summary for Provider     []  Critical Lab, Recommendations Needed  [x] Need Additional Advice  []   FYI    []   Need Orders  [] Need Medications Sent to Pharmacy  []  Other     SUMMARY: Glenis Walker (daughter) reports since pts' discharge from

## 2018-02-28 NOTE — TELEPHONE ENCOUNTER
Patients daughter calling to check status of refill request. Daughter states patient is out of all medications.      Please Advise

## 2018-03-05 NOTE — TELEPHONE ENCOUNTER
Pt daughter requesting refill   Previously filled by Dr marshall pt was in the hospital   metolazone 2.5 MG Oral Tab       Sig :  Take 2.5 mg by mouth.

## 2018-03-05 NOTE — TELEPHONE ENCOUNTER
PHYLICIA [facility] HAD THIS PATIENT COMING IN TOMORROW FOR SLEEP STUDY. IT WAS ORDERED BY   RE HAB DOCTOR.  NOW THEY NEED THE OV NOTES AN ORDER ANY PREVIOUS SLEEP STUDY'S DONE. THERE IS A FORM TO SIGN RE: WHO WILL BE GIVING THE RESULTS TO PATIENT.

## 2018-03-06 NOTE — TELEPHONE ENCOUNTER
TCW please advise on refill request     Medication was previously ordered by [facility] MD

## 2018-03-07 NOTE — TELEPHONE ENCOUNTER
Pofririo Murphy from [facility]. They do not need the information anymore pt was seen by them yesterday.

## 2018-03-20 NOTE — PROGRESS NOTES
Chart reviewed. HIPAA verified. Spoke with daughter Freddy Dominguez, she is driving and cannot talk at this time. Will call her back later this week. Time Spent This Encounter Total: 7 min medical record review, telephone,  communication.        Monthly Minute T

## 2018-03-23 NOTE — TELEPHONE ENCOUNTER
Josselyn Sidhu RN from [employer], states she has been waiting for forms to be faxed back, states several forms have been faxed to the office regarding plan of care, discharge summary, physician orders and PT evaluation, forms need doctor's signature.  Josselyn Sidhu will fax form

## 2018-03-27 NOTE — PROGRESS NOTES
[id number]  name and number provided for further follow up. Time Spent This Encounter Total: 1 min medical record review, telephone,  communication.      Monthly Minute Total including today: 9

## 2018-03-28 NOTE — TELEPHONE ENCOUNTER
Spoke with daughter Michael Ridley (SERENITY chan) and states patient finished hydrocortisone cream prescribed by CLAUDINE at 2/26/18 OV 4 days ago.  Daughter reports cream helped a lot, but last night, patient c/o burning and itching to bilateral legs (6/10 discomfort)-int

## 2018-03-28 NOTE — TELEPHONE ENCOUNTER
Would recommend seeing dermatologist ; assist pt in getting apptment right away with [facility] derm clinic pls.  thanks

## 2018-04-02 NOTE — TELEPHONE ENCOUNTER
Jt Jiménez called and states she has not received forms  [facility]#[phone]

## 2018-04-05 NOTE — TELEPHONE ENCOUNTER
[facility] nurse came in yesterday and dropped off a folder with forms that need to be signed for pt. Folder is on your desk Dr. Nini Adams. She would like us to call once it is ready so she can pu.

## 2018-04-13 NOTE — TELEPHONE ENCOUNTER
Late entry- form completed and faxed back to pharm [facility] [phone], after I  Had spoken with pt's daughter [place] this AM.

## 2018-04-13 NOTE — PROGRESS NOTES
Spoke to daughter Cindy Goddard, HIPAA verified for CCM call.     Medical History  Patient Active Problem List:     Hyperglycemia     Azotemia     Atrial fibrillation, new onset (HCC)     Elevated troponin     Acute on chronic congestive heart failure, unspecified pulse ox yet which she was going to do. • Update to previous barriers: dtr has had her own surgery and recovery. Pulse ox is being monitored weekly by [facility]    • Patient Reported New Barriers And Concerns:  Dtr will work towards getting one.

## 2018-04-30 NOTE — PROGRESS NOTES
Bolivar Escalera is a 80year old female.     Patient presents with:  Rash: \"New pt\"- Pt presenting today with redness and itching to bilateral lower legs daughter notes was in hospital Jan 1st  for another condition and was given Ammonium lactate which wa Rfl: 2   hydrocortisone 2.5 % External Cream Apply 1 Application topically 2 (two) times daily. Disp: 30 g Rfl: 0   HYDROcodone-acetaminophen 5-325 MG Oral Tab Take 1 tablet by mouth every 6 (six) hours as needed for Pain.  Disp:  Rfl:    ipratropium-albute Inhale 2 puffs into the lungs as needed. Disp:  Rfl: 3   Cranberry-Vitamin C-Vitamin E (CRANBERRY PLUS VITAMIN C) 4200-20-3 MG-MG-UNIT Oral Cap Take 4,200 mg by mouth daily. Disp:  Rfl:    folic acid 091 MCG Oral Tab Take 400 mcg by mouth daily.  Disp:  R (100 mg total) by mouth nightly. Disp: 90 capsule Rfl: 0   Cholecalciferol (VITAMIN D3) 74194 units Oral Cap Take 1 capsule by mouth once a week. Disp:  Rfl:    methimazole 10 MG Oral Tab Take 1 tablet by mouth daily.  Disp:  Rfl: 2   hydrocortisone 2.5 % E topically daily.  Disp: 45 g Rfl: 5   ERGOCALCIFEROL 90851 units Oral Cap TAKE ONE CAPSULE BY MOUTH EVERY 2 WEEKS (Patient taking differently: weekly) Disp: 6 capsule Rfl: 0   PROAIR  (90 Base) MCG/ACT Inhalation Aero Soln Inhale 2 puffs into the [place] [facility] notes no pain as of today but tender if touched or rubbing on the bed sheets. Left leg had ulceration which is healed was followed in the wound clinic. History of CHF A. fib. Hospitalized and in rehab recently.   Has been using the lactic acid cause m her CHF, breathing has been stable. Unable to wear compression stockings. Discussed possible sequential compression device. May benefit from lymphatic massage with PT.   Cardiology would need to sign off on this and this could exacerbate her CHF continue

## 2018-05-09 NOTE — PROGRESS NOTES
Spoke to Barbara Jones, HIPAA verified for CCM call.     Medical History  Patient Active Problem List:     Hyperglycemia     Azotemia     Atrial fibrillation, new onset (HCC)     Elevated troponin     Acute on chronic congestive heart failure, unspecified [place] • Patient reported progress toward goal: checked by  nurse but does not have one purchased yet.         • Update to previous barriers: hh continuing    • Patient Reported New Barriers And Concerns: none                   - Plan for overcom

## 2018-05-09 NOTE — TELEPHONE ENCOUNTER
Please advise on Hydrocodone refill request. Pt's daughter Ashley Wheeler ) calling back on refill status.  Please respond to pool: EM IM ADO LPN/CMA    Daughter informed that Dr. Jasper Mclaughlin not in the office today and is willing to wait until doctor is in the office to

## 2018-05-10 NOTE — TELEPHONE ENCOUNTER
Dr. Bengino Galvan: please review; daughter called to f/u if RX can be approved for patient. She is out of medication and in pain.      Last rx given 11/20/17

## 2018-05-10 NOTE — TELEPHONE ENCOUNTER
Patient's daughter calling wanting to know if Hydrocodone refill was approved. Hydrocodone script was approved today. Called Dr Daquan George office. Script signed and ready at . Daughter has until 7 p.m. To  prescription from office.      P

## 2018-05-10 NOTE — TELEPHONE ENCOUNTER
Doctor, pt's daughter called to follow up on refill request from yesterday. States pt is out of med and has been taking tylenol for pain.

## 2018-05-19 NOTE — TELEPHONE ENCOUNTER
Pt daughter requesting refill   SIMVASTATIN 5 MG Oral Tab 90 tablet 0 4/10/2018     Sig: TAKE 1 TABLET(5 MG) BY MOUTH EVERY NIGHT    E-Prescribing Status: Receipt confirmed by pharmacy (4/10/2018  7:19 AM CDT)

## 2018-05-21 NOTE — TELEPHONE ENCOUNTER
Left message on voicemail that Rx sent to mail order pharmacy on 4/10/18 for quantity of 90. If any questions to give the office a call back.

## 2018-05-21 NOTE — TELEPHONE ENCOUNTER
Pharmacy     [facility], [phone], [phone]   Medication Detail      Disp Refills Start End    SIMVASTATIN 5 MG Oral Tab 90 tablet 0 4/10/2018     Sig: [place]

## 2018-05-23 NOTE — TELEPHONE ENCOUNTER
Refill Protocol Appointment Criteria Please advise on Gabapentin refill request. No recent visit notes address need for Rx.     Rx pended for MD approval.       · Appointment scheduled in the past 6 months or in the next 3 months  Recent Outpatient Visits

## 2018-05-29 NOTE — TELEPHONE ENCOUNTER
Pt daughter calling to inform Dr Mynor Hurtado the home health RN came to see pt today and left a urine collection container for urine sample. [facility] RN informed pt daughter he will be back tomorrow to collect the urine and take it to the lab.     Will inform MD

## 2018-05-29 NOTE — TELEPHONE ENCOUNTER
Action Requested: Summary for Provider     []  Critical Lab, Recommendations Needed  [] Need Additional Advice  []   FYI    []   Need Orders  [x] Need Medications Sent to Pharmacy  []  Other     SUMMARY: REQUESTING ABX, UTI    Pt's daughter calling, states

## 2018-05-29 NOTE — TELEPHONE ENCOUNTER
Called patient's daughter - verified patient's name and  - informed patient's daughter of doctor's note - daughter verbalized understanding    Med ordered

## 2018-06-01 NOTE — TELEPHONE ENCOUNTER
Hypertensive Medications  Protocol Criteria:  · Appointment scheduled in the past 6 months or in the next 3 months  · BMP or CMP in the past 12 months  · Creatinine result < 2  Recent Outpatient Visits            1 month ago [address]

## 2018-06-14 NOTE — PROGRESS NOTES
[id number]  name and number provided for further follow up. Time Spent This Encounter Total: 1 min medical record review, telephone,  communication.        Monthly Minute Total including today: 6

## 2018-06-25 NOTE — TELEPHONE ENCOUNTER
Per Cortez Collins they been faxing to [facility] an Order for Physician Order since 05/25/2018 and plan of care ands until now no response yet, can fax it back to [phone]. Pls advise.

## 2018-06-26 NOTE — TELEPHONE ENCOUNTER
Christina Lees from [address] [place] [address] called to follow up.    Fax # [phone]

## 2018-06-27 NOTE — PROGRESS NOTES
Chart reviewed. Call to aurelio Brittani Natalee. [id number]  name and number provided for further follow up. Time Spent This Encounter Total: 4 min medical record review, telephone,  communication.        Monthly Minute Total including today:

## 2018-06-29 PROBLEM — I50.9 ACUTE ON CHRONIC CONGESTIVE HEART FAILURE (HCC): Status: ACTIVE | Noted: 2017-01-26

## 2018-06-29 NOTE — PROGRESS NOTES
HPI:    Patient ID: Jesse Macdonald is a 80year old female. Patient arrives today with her daughter that is providing her medical information. HPI  Afib  Chronic. Associated symptoms include shortness of breath.  Pertinent negatives include no chest adelita Disp: 90 tablet Rfl: 0   Omeprazole 40 MG Oral Capsule Delayed Release Take 1 capsule (40 mg total) by mouth once daily.  Disp: 90 capsule Rfl: 0   Metoprolol Tartrate 50 MG Oral Tab TK 1 T PO BID Disp: 180 tablet Rfl: 0   XARELTO 15 MG Oral Tab TK 1 T PO B 500-200 MG-UNIT Oral Tab Take 1 tablet by mouth daily. Disp:  Rfl:    triamcinolone acetonide 0.1 % External Cream Apply topically 2 (two) times daily as needed.  Disp: 60 g Rfl: 3   Nystatin (NYSTOP) 142827 UNIT/GM External Powder Apply 1 Application topic heart failure type ([facility] [address]  -Chronic condition   -Continue taking diuretics and prednisone 10mg             No orders of the defined types were placed in this encounter.       Meds This Visit:  Signed Prescriptions Disp Refills    HYDROcodone-acetaminophen 5-

## 2018-07-11 NOTE — TELEPHONE ENCOUNTER
[address] [phone] ext [phone] states that the last two pages on the plan of care were not signed, can they be signed and re faxed? She is also looking for status of the order.

## 2018-07-12 NOTE — PROGRESS NOTES
[id number]  name and number provided for further follow up. Time Spent This Encounter Total: 5 min medical record review, telephone,  communication.        Monthly Minute Total including today: 5

## 2018-07-12 NOTE — PROGRESS NOTES
HPI:    Patient ID: Arvind Hernandez is a 80year old female. Pt presents with her daughter who provided history; pt is in a wheel chair. HPI   Right Low Back Pain   This is a new problem.  The current episode started in the past 7 days without any know EVERY NIGHT Disp: 90 tablet Rfl: 0   Omeprazole 40 MG Oral Capsule Delayed Release Take 1 capsule (40 mg total) by mouth once daily.  Disp: 90 capsule Rfl: 0   Metoprolol Tartrate 50 MG Oral Tab TK 1 T PO BID Disp: 180 tablet Rfl: 0   XARELTO 15 MG Oral Tab differently: Take 25 mg by mouth 2 (two) times daily.  ) Disp: 180 tablet Rfl: 3   Calcium Carbonate-Vitamin D 500-200 MG-UNIT Oral Tab Take 1 tablet by mouth daily.  Disp:  Rfl:    triamcinolone acetonide 0.1 % External Cream Apply topically 2 (two) times been giving her hydrocodone every night for the pain to help her sleep. Physical exam, there was no pain on straight leg raise. Recommend XR of the Lumbar spine and Hip and I advised daughter she may take the hydrocodone up to 3 times a day.       No ord

## 2018-07-14 NOTE — TELEPHONE ENCOUNTER
Pt's dght Eileen Youngblood) informed of MD recommendation per hipaa, she stated understanding.         Future Appointments  Date Time Provider Pedro Nunu   9/28/2018 11:15 AM Zina River MD [facility]

## 2018-07-14 NOTE — TELEPHONE ENCOUNTER
Lumbar xr showed arthritis of spine.  ffup with dr Evangelista Listen on Monday or go to er if pain severe and persist

## 2018-07-14 NOTE — TELEPHONE ENCOUNTER
Pt's daughter called in, would like to know the results of x-rays that were take after OV on 7/12 have been reviewed. Wants recommendation as pt is still c/o pain. Using Norco as prescribed.  Daija Sorenson states that TCW mentioned in the OV that a muscle relaxer w

## 2018-07-26 NOTE — TELEPHONE ENCOUNTER
Please advise on refill request. Last 6/29/18    Daughter Dallin Guillen called for refill for the patient, she would like to  tomorrow, please call her when ready.     Please respond to pool: IWONA AVILES LPN/MAGDALENE    Refill Protocol Appointment Criteria  · Appoi

## 2018-08-08 NOTE — TELEPHONE ENCOUNTER
[facility] is calling in regards to medication refill  Spironolactone 25mg. Per pharmacy sent it electronically today. Please Advise.

## 2018-08-08 NOTE — TELEPHONE ENCOUNTER
Daughter/Camryn [phone] is calling for the status of patient's medication. Per daughter pt is out of medication.

## 2018-08-09 NOTE — TELEPHONE ENCOUNTER
Hypertensive Medications  Protocol Criteria:  · Appointment scheduled in the past 6 months or in the next 3 months  · BMP or CMP in the past 12 months  · Creatinine result < 2  Recent Outpatient Visits            3 weeks ago Acute right-sided low back pain

## 2018-08-13 NOTE — PROGRESS NOTES
HIPAA Verified. Call to aurelio Barahona ([id number]).  name and number provided for further follow up. Chart reviewed prior to call. Time Spent This Encounter Total:6 min medical record review, telephone,  communication.      Monthly M

## 2018-08-15 NOTE — TELEPHONE ENCOUNTER
Per Km she faxed last 07/31/2018 in [facility] a physician order and an Aid plan also last 08/01/2018 a PT evalualike to know if received. would like to know if can be faxed the completed form asap. Pls fax back to [phone]. Pls advise.

## 2018-08-15 NOTE — PROGRESS NOTES
Rec'd vmm from daughter returning [facility] call. Time Spent This Encounter Total: 2 min medical record review, telephone,  communication.        Monthly Minute Total including today: 8

## 2018-08-15 NOTE — PROGRESS NOTES
[id number]  name and number provided for further follow up. Time Spent This Encounter Total: 1 min medical record review, telephone,  communication.        Monthly Minute Total including today: 9

## 2018-08-16 NOTE — PROGRESS NOTES
Rec'd vmm from pt daughter returning call. Time Spent This Encounter Total: 1 min medical record review, telephone,  communication.        Monthly Minute Total including today: 10

## 2018-08-16 NOTE — PROGRESS NOTES
Spoke to daughter Cari Lester, HIPAA verified for CCM call.     Medical History  Patient Active Problem List:     Hyperglycemia     Azotemia     Atrial fibrillation, new onset (HCC)     Elevated troponin     Acute on chronic congestive heart failure (HCC)      seen sooner. Previous goal met: ongoing    Self Management Goals/Action Plan:    • Goal from previous outreach: Will check pulse oximeter. • Patient reported progress toward goal: spot checking, readings are wnl.   VN is checki

## 2018-08-17 NOTE — TELEPHONE ENCOUNTER
Pt's daughter called requesting to p/u RX Pending, stated she only have enough until Sunday, due to her chronic pain she takes 3 a day   LOV 7/12/18, LR 7/27/18      Pt would like a call when ready to p/u     Please reply to pool: IWONA George

## 2018-08-17 NOTE — TELEPHONE ENCOUNTER
S/W daughter, [facility] and advised script for Hydrocodone is ready for p/u. Instructed her to stop at main reception area on 2nd flr. Script is at [employer] work station.

## 2018-08-18 NOTE — TELEPHONE ENCOUNTER
Refill Protocol Appointment Criteria  · Appointment scheduled in the past 6 months or in the next 3 months  Recent Outpatient Visits            1 month ago Acute right-sided low back pain without sciatica    [facility] [place], [facility], [address], Michelle Palma

## 2018-08-22 NOTE — TELEPHONE ENCOUNTER
Refill Protocol Appointment Criteria  · Appointment scheduled in the past 12 months or in the next 3 months  Recent Outpatient Visits            1 month ago Acute right-sided low back pain without sciatica    [facility] [place], [facility], [address], [address], [place]

## 2018-08-24 NOTE — TELEPHONE ENCOUNTER
Pharmacy called requesting refill for     Current Outpatient Prescriptions:  Metoprolol Tartrate 50 MG Oral Tab TK 1 T PO BID Disp: 180 tablet Rfl: 0

## 2018-08-25 NOTE — TELEPHONE ENCOUNTER
Hypertensive Medications  Protocol Criteria:  · Appointment scheduled in the past 6 months or in the next 3 months  · BMP or CMP in the past 12 months  · Creatinine result < 2  Recent Outpatient Visits            1 month ago Acute right-sided low back pain

## 2018-08-28 NOTE — TELEPHONE ENCOUNTER
Spoke with Collette Durand he is actually looking for orders dated 7/23 Physician order, 7/28 physical therapy & 7/28 Aide care plan. I checked Media and no orders seen with those dates. Daniel King will fax those orders.

## 2018-08-28 NOTE — TELEPHONE ENCOUNTER
[facility] orders received with dates listed below and Physician order 8/9 &8/16. All placed on Dr. Renata Nieves desk for review/ signature.

## 2018-08-28 NOTE — TELEPHONE ENCOUNTER
[facility] [phone] states that they have not received the fax. Please, re-fax to [phone].

## 2018-08-28 NOTE — PROGRESS NOTES
Spoke with daughter Barbara Jones. She reports that the visiting nurse was out to see her mother today. Pt is in need of breathing treatment q morning and has gained 9# this week. Pt is not currently SOB.   Discussed with her that this is important information for

## 2018-08-28 NOTE — TELEPHONE ENCOUNTER
[facility] and I have 2 confirmations that it went through. I faxed forms to the same # he just gave us. Is there I other fax # we can use?

## 2018-09-06 NOTE — TELEPHONE ENCOUNTER
Pt's daughter req refill for meds below and states almost out meds,    •  HYDROcodone-acetaminophen 5-325 MG Oral Tab, Take 1 tablet by mouth every 6 (six) hours as needed for Pain., Disp: 60 tablet, Rfl: 0

## 2018-09-10 NOTE — TELEPHONE ENCOUNTER
Pt's daughter Kaitlin Nieves is here to  rx Norco for pt. Rx given to Rain Martin so she can have Kaitlin Nieves sign out.

## 2018-09-11 NOTE — PROGRESS NOTES
[id number]  name and number provided for further follow up. Time Spent This Encounter Total: 8 min medical record review, telephone,  communication.        Monthly Minute Total including today:8

## 2018-09-13 NOTE — TELEPHONE ENCOUNTER
Patient requesting refill of potassium.   Last BMP done 8/30/18 4.4    Patient states only taking one daily  Please advise on refill and sig;

## 2018-09-13 NOTE — TELEPHONE ENCOUNTER
Patient requesting refill for       Potassium Chloride ER 20 MEQ Oral Tab CR Take 1 tablet (20 mEq total) by mouth 2 (two) times daily.  (Patient taking differently: Take 20 mEq by mouth daily.  ) Disp: 180 tablet Rfl: 0       Pharmacy has been faxing reque

## 2018-09-14 NOTE — TELEPHONE ENCOUNTER
rx pending.  LMTCB for pt's daughter Ashley Scott- need to know which pharmacy she would like med sent to ?

## 2018-09-14 NOTE — PROGRESS NOTES
[id number]  name and number provided for further follow up. Time Spent This Encounter Total: 1 min medical record review, telephone,  communication.        Monthly Minute Total including today: 9

## 2018-09-15 NOTE — TELEPHONE ENCOUNTER
Called patient, spoke with daughter Edgar Marquez), states that can send  the prescription to Mail in  [address] [facility] [address]. Medication generated and esent to [facility] today.

## 2018-09-17 NOTE — TELEPHONE ENCOUNTER
Action Requested: Summary for Provider     []  Critical Lab, Recommendations Needed  [] Need Additional Advice  []   FYI    []   Need Orders  [x] Need Medications Sent to Pharmacy  []  Other     SUMMARY: Per daughter Mary Bodily pt c/o urinary burning, pressure

## 2018-09-17 NOTE — PROGRESS NOTES
Spoke to daughter Hollie Loza, HIPAA verified for CCM call.     Medical History  Patient Active Problem List:     Hyperglycemia     Azotemia     Atrial fibrillation, new onset (HCC)     Elevated troponin     Acute on chronic congestive heart failure ([facility] [address] per dtr, legs are looking ok, using cream that derm prescribed to keep things in check. · Pain- dtr reports pt takes prednisone 10 mg daily to \"help with her legs. \"  Dtr stts she is out and awaiting new script from rheumatologist Dr. Martinez Galvez.

## 2018-09-24 NOTE — PROGRESS NOTES
HPI:    Patient ID: Mino Li is a 80year old female. History given by daughter of pt    Patient presents with:  Wheezing    Wheezing    This is a new problem. The problem occurs constantly. Associated symptoms include shortness of breath.  Vance Overton Oral Tab Take 1 tablet by mouth every 6 (six) hours as needed for Pain.  Disp: 60 tablet Rfl: 0   METOLAZONE 2.5 MG Oral Tab TAKE 1 TABLET(2.5 MG) BY MOUTH DAILY Disp: 90 tablet Rfl: 0   Metoprolol Tartrate 50 MG Oral Tab TK 1 T PO BID Disp: 180 tablet Rfl: as needed for Pain. Disp:  Rfl:    GuaiFENesin  MG Oral Tablet 12 Hr Take by mouth 2 (two) times daily. Disp:  Rfl:    Meclizine HCl 12.5 MG Oral Tab Take 1 tablet (12.5 mg total) by mouth 3 (three) times daily as needed.  (Patient taking differently: (L) 01/26/2017    T4F 1.09 01/26/2017            ASSESSMENT/PLAN:     (I50.9) Acute on chronic congestive heart failure, unspecified [facility]  (primary encounter diagnosis)  -This is a chronic problem  -pt c/o wheezing and SOB;  There was si

## 2018-10-01 NOTE — PROGRESS NOTES
HPI:    Patient ID: Joshua Edgar is a 80year old female. History given by daughter of pt    Patient presents with:  Edema: fup   Chest Pain: Daughter stts pt c/o heart pain. does have appt with cardio tomorrow.    Medication Request: needs norco refill history on file. Social History    Tobacco Use      Smoking status: Never Smoker      Smokeless tobacco: Never Used    Alcohol use: No      Alcohol/week: 0.0 oz    Drug use:  No                Current Outpatient Medications:  HYDROcodone-acetaminophen 5-3 Cream Apply 1 Application topically every evening. Disp: 1 Tube Rfl: 5   predniSONE 10 MG Oral Tab TK 1 T PO D WITH LUNCH. Disp:  Rfl: 0   Ferrous Fumarate (IRON) 18 MG Oral Tab CR Take by mouth.  Disp:  Rfl:    magnesium oxide 400 MG Oral Tab Take 400 mg b not diaphoretic. 10/01/18  1057 10/01/18  1101   BP: 95/63 109/73   Pulse: 92 96   Temp: 96.7 °F (35.9 °C)    TempSrc: Tympanic    Weight: 218 lb 1.6 oz (98.9 kg)          Body mass index is 42.59 kg/m².   Cholesterol:     Lab Results   Component Helen documentation. All medical record entries made by the scribe were at my direction and in my presence.   I have reviewed the chart and discharge instructions (if applicable) and agree that the record reflects my personal performance and is accurate and compl

## 2018-10-12 NOTE — TELEPHONE ENCOUNTER
Action Requested: Summary for Provider     []  Critical Lab, Recommendations Needed  [x] Need Additional Advice  []   FYI    [x]   Need Orders  [] Need Medications Sent to Pharmacy  []  Other     SUMMARY: Daughter requesting MD recommendation regarding

## 2018-10-16 NOTE — TELEPHONE ENCOUNTER
Daughter Sue Young was never called with CTW's response below. States she only gave pt Tramadol (that was prescribed [address]), not the [place], but states it seems to be making pt \"hallucinate. \" States pt did not hit her head but has began speaking thing

## 2018-10-16 NOTE — TELEPHONE ENCOUNTER
Spoke with daughter Camryn--reports she was attempting to get patient in car to make appt today with TCW and patient fell on cement. Ana Chahal called an ambulance and patient was taken to [facility] ER for evaluation.     She is on the way to the hospit

## 2018-10-26 NOTE — PROGRESS NOTES
Chart reviewed. Noted that pt had 2 ER visits within the past month to [facility] ER per dtr reporting. Spoke with daughter, she will call CCMN back as she is not currently at home.      Time Spent This Encounter Total: 8 min medical record review, t

## 2018-10-31 NOTE — PROGRESS NOTES
Spoke with daughter and she stts that her mother is currently in the hospital at [facility]. Per daughter, pt went in on 10/16/18, is currently being treated for UTI, CHF. Pt has picc in place, may transfer there with iv abx.    Plan is for pt to tra

## 2018-11-02 PROBLEM — J90 LARGE PLEURAL EFFUSION: Status: ACTIVE | Noted: 2018-01-01

## 2018-11-03 PROBLEM — I95.9 HYPOTENSION, UNSPECIFIED HYPOTENSION TYPE: Status: ACTIVE | Noted: 2018-01-01

## 2018-11-03 PROBLEM — N17.9 ACUTE KIDNEY INJURY (HCC): Status: ACTIVE | Noted: 2018-01-01

## 2018-11-03 NOTE — CONSULTS
[facility]    Report of Consultation    Radha Letters Patient Status:  Inpatient    1932 MRN [id number]   Location [facility] 2W/SW Attending Fide Mcpherson MD   Hosp Day # 0 PCP ABDULAZIZ Franklin MD     Date of Admission:  1 (VANCOCIN) 1,250 mg in sodium chloride 0.9 % 500 mL IVPB 1,250 mg Intravenous Q48H   ipratropium-albuterol (DUONEB) nebulizer solution 3 mL 3 mL Nebulization 6 times per day   acetaminophen (TYLENOL) tab 650 mg 650 mg Oral F1Y PRN   folic acid (FOLVITE) ta Oral Tab TK 1 T PO BID   Omeprazole 40 MG Oral Capsule Delayed Release Take 1 capsule (40 mg total) by mouth once daily. GABAPENTIN 100 MG Oral Cap TAKE 1 CAPSULE(100 MG) BY MOUTH EVERY NIGHT   methimazole 10 MG Oral Tab Take 1 tablet by mouth daily.    A moderately obese    Pulmonary: diminished breath sounds bilaterally  Breasts:  pendulous   Cardiovascular: S1, S2 normal  Abdominal: soft, non-tender; well healed midline surgical scars (long) supraumbilical & infraumbilical , obese , distended , minimally report was submitted and there is agreement without major discrepancies. Dictated by (CST): Billy Ricks MD on 11/03/2018 at 6:37     Approved by (CST):  Billy Ricks MD on 11/03/2018 at [address] :     Acute kidney i

## 2018-11-03 NOTE — CONSULTS
Deandra NOTE  Cardiology Consultation    Radha Letters Patient Status:  Inpatient    1932 MRN [id number]   Location [facility] 2W/SW Attending Fide Mcpherson MD   Hosp Day # 0 PCP ABDULAZIZ Franklin MD     Reason for [facility] thromboembolism) ([facility] [address]     Large pleural effusion     Acute kidney injury ([facility] [address]     Hypotension, unspecified hypotension type          Assessment and Plan:  Discussed in detail with Dr. Kristine Mulligan    The plan will be to hold her Eliquis and when felt safe to h Oral Tab TAKE 1/2 TABLET(12.5 MG) BY MOUTH THREE TIMES DAILY Disp: 45 tablet Rfl: 0    HYDROcodone-acetaminophen 5-325 MG Oral Tab Take 1 tablet by mouth every 6 (six) hours as needed for Pain.  Disp: 60 tablet Rfl: 0    IPRATROPIUM-ALBUTEROL 0.5-2.5 (3) MG mcg by mouth daily. Disp:  Rfl:  Taking   Potassium Chloride ER 20 MEQ Oral Tab CR Take 1 tablet (20 mEq total) by mouth daily.  Disp: 90 tablet Rfl: 0 Taking   SIMVASTATIN 5 MG Oral Tab TAKE 1 TABLET BY MOUTH EVERY NIGHT Disp: 90 tablet Rfl: 0 Taking   m 11/03/18 0659 11/03/18 0700 - 11/04/18 0659       Intake    I.V.  --  1000  673    I.V. -- -- 653    Volume (mL) Norepinephrine -- -- 20    Volume (mL) (0.9%  NaCl infusion) -- 1000 --    IV PIGGYBACK  --  100  --    Volume (mL) (Meropenem (MERREM) 500 mg

## 2018-11-03 NOTE — PROGRESS NOTES
[facility] dosing service    Initial Pharmacokinetic Consult for Vancomycin Dosing     Valery Casas is a 80year old female who is being treated for pneumonia.   Pharmacy has been asked to dose Vancomycin by Dr. Oneal Batter    She is allergic to penicillins monitor renal function changes, toxicity and efficacy. Pharmacy will continue to follow her. We appreciate the opportunity to assist in her care.     Maria Fonseca [facility]  11/3/2018  [address]: [phone]

## 2018-11-03 NOTE — SLP NOTE
ADULT SWALLOWING EVALUATION    ASSESSMENT    ASSESSMENT/OVERALL IMPRESSION:      Pt assessed sitting upright in bed on 4L/min 02 via NC. Pt presented with generalized weakness and fatigue. Pt with lower natural dentition/no upper.  Pt was fed solid, nectar, - Solids: Puree  Diet Recommendations - Liquid: Nectar thick       Compensatory Strategies Recommended: No straws; Alternate consistencies  Aspiration Precautions: Upright position; Slow rate;Small bites and sips; No straw  Medication Administration Recommend of Swallow: Impaired        Bolus Formation: Impaired  Bolus Propulsion: Impaired  Mastication: Impaired  Retention: Impaired    Pharyngeal Phase of Swallow: Impaired  Laryngeal Elevation Timing: Appears impaired  Laryngeal Elevation Strength: Appears impa

## 2018-11-03 NOTE — PROGRESS NOTES
Dr. Norbetr Zee and Dr. Luis E Macdonald at bedside order received to start patient on Levo.      11/03/18  0800   BP: (!) 84/37   Pulse: 88   Resp: 26   Temp:

## 2018-11-03 NOTE — ED INITIAL ASSESSMENT (HPI)
AMS. increased lethargy. Responsive to pain. LKW yesterday.  Family found her at dinner to be \"not acting right\"

## 2018-11-03 NOTE — H&P
[address] [place] [address] Patient Status:  Inpatient    1932 MRN [id number]   Location [facility] 2W/ Attending Miky Cassidy MD   Hosp Day # 0 PCP ABDULAZIZ March MD     Date:  11/3/2018  Date of hypertension    • High blood pressure    • High cholesterol    • Hyperlipidemia    • Sleep apnea      Past Surgical History:   Procedure Laterality Date   • THORACENTESIS, ASPIRATN       No family history on file.   Social History:  Social History    Tobacc Tab TK 1 T PO D WITH LUNCH.   acetaminophen 325 MG Oral Tab Take 650 mg by mouth every 6 (six) hours as needed for Pain. GuaiFENesin  MG Oral Tablet 12 Hr Take by mouth 2 (two) times daily.    Meclizine HCl 12.5 MG Oral Tab Take 1 tablet (12.5 mg to 1,000 mL Intravenous Once   • MethylPREDNISolone Sodium Succ  40 mg Intravenous Q12H   • [START ON 11/4/2018] vancomycin  1,250 mg Intravenous Q48H   • ipratropium-albuterol  3 mL Nebulization 6 times per day   • meropenem  500 mg Intravenous Q12H       Co injury  6–history of recurrent UTI  7–with obesity with a BMI of 41  8– SADI  9– history of arthritis ,  on steroid for severe pain joints pain  10–COPD and asthma  11–dysphagia possible aspiration  12– HTN and HL  13–Goal of care : Patient is a full DNR/DN

## 2018-11-03 NOTE — ED PROVIDER NOTES
Patient Seen in: [facility] Emergency Department    History   Patient presents with:  Altered Mental Status (neurologic)    Stated Complaint: AMS    HPI    Pt is 79 yo F from [facility] who presents by EMS for lethargy and altered mental state.  His kg/m²         Physical Exam    GENERAL: pt responding to painful stimuli with eyes opening and moaning only. HEENT: MMM, EOMI, PERRL  Neck: no jvd  CV: afib, no murmurs  Resp: +expiratory wheezing noted, no retractions.  Diminished breath sounds  Ab: +dis DIFFERENTIAL WITH PLATELET.   Procedure                               Abnormality         Status                     ---------                               -----------         ------                     CBC W/ DIFFERENTIAL[[id number]]          Abnormal left posterior temporal convexity (axial image 17). No underlying parenchymal edema. While all of these statistically reflect meningiomas, dural metastatic disease is also a possibility in this age group.   This could be further evaluated with dedicated w Disposition and Plan     Clinical Impression:  Large pleural effusion  (primary encounter diagnosis)  Acute kidney injury ([facility] [address]  Hypotension, unspecified hypotension type    Disposition:  Admit  11/3/2018 12:14 am    Follow-up:  No follow-up pr

## 2018-11-03 NOTE — ED NOTES
PTs bp noted in the 60's and 70's for multiple episodes, though responds to painful stimulation. Fluid rate increased via IV pump, Dr. Anirudh Solis made aware of pts status.   Recommends continued monitoring and re evaluation of course of treatment after comple

## 2018-11-04 PROBLEM — I50.30 DIASTOLIC CHF (HCC): Status: ACTIVE | Noted: 2018-01-01

## 2018-11-04 NOTE — PROGRESS NOTES
[facility]    Progress Note    Laura Kebede Patient Status:  Inpatient    1932 MRN [id number]   Location [facility] 2W/SW Attending Katelyn Hardwick MD   Hosp Day # 1 PCP ABDULAZIZ Espitia MD       Subjective:   Jamila Chao norepinephrine (LEVOPHED) 4 mg/250 ml premix infusion, 0.5-30 mcg/min, Intravenous, Continuous  •  [COMPLETED] Meropenem (MERREM) 500 mg in sodium chloride 0.9% 100 mL MBP, 500 mg, Intravenous, Once **FOLLOWED BY** Meropenem (MERREM) 500 mg in sodium chlor opacification of the entire left hemithorax which is new since 11/22/17 but otherwise unchanged since 11/3/18.  Findings may relate to mucous plugging, however underlying endobronchial lesion is also within the differential. If clinically indicated this cou (CST): Katie Harding MD on 11/04/2018 at 7:26          Xr Chest Ap Portable  (cpt=71045)    Result Date: 11/3/2018  CONCLUSION:  1. Unfavorable change from November 22, 2017 with complete opacification of the left hemithorax.  2. Prominent markings righ

## 2018-11-04 NOTE — PROGRESS NOTES
[facility]    Cardiology Progress Note    Moy Short Patient Status:  Inpatient    1932 MRN [id number]   Location [facility] 2W/SW Attending Irving Rondon, [address] [facility] [address] Day # 1 PCP ABDULAZIZ Joshi MD     Patient Active Prob sodium chloride 0.9%  1,000 mL Intravenous Once   • MethylPREDNISolone Sodium Succ  40 mg Intravenous Q12H   • vancomycin  1,250 mg Intravenous C14R   • folic acid  840 mcg Oral Daily   • gabapentin  100 mg Oral BID   • methimazole  10 mg Oral Daily   • Me Brain Or Head (02300)    Result Date: 11/3/2018  CONCLUSION:  1. Three extra-axial dural based mass lesions within the left CPA cistern, left posterior temporal-occipital convexity and right frontal convexity suggest multifocal meningiomas.  Dural based met Nonspecific ST and T wave abnormality ABNORMAL When compared with ECG of 02/01/2017 11:58:33 No significant changes have occurred Electronically signed on 11/03/2018 at 16:07 by Augie Avery MD  11/4/2018

## 2018-11-04 NOTE — PROGRESS NOTES
Dr Severiano Boeck / addendum :   Daughter and other family member not interested in further procedure .   Will cancel thoracentesis   They want palliative care - comfort measures only    pcu now / hospitalist following / d/w dr Celestino Galarza

## 2018-11-04 NOTE — PLAN OF CARE
Patient will remain free from self-harm Progressing      Safety Risk - Non-Violent Restraints    • Patient will remain free from self-harm Progressing        Educated about not reaching for ivs

## 2018-11-04 NOTE — PLAN OF CARE
CARDIOVASCULAR - ADULT    • Maintains optimal cardiac output and hemodynamic stability Completed    • Absence of cardiac arrhythmias or at baseline Completed        Integumentary status not within defined limits    • Pt's integumentary status will be adequ

## 2018-11-04 NOTE — PROGRESS NOTES
Patient transferred from PCU. Family at bedside. Hospice RN in to see patient and family, transitioned into inpatient hospice. Family agreeable for scop patch and morphine gtt. Will initiate once up from pharmacy.

## 2018-11-04 NOTE — HOSPICE RN NOTE
Admission to hospice approved by Dr. Kulwinder López. Dx Diastolic chf with acute on chronic respiratory failure. Appropriate for GIP for management of respiratory distress. Hospice RN to visit daily and prn to assess comfort and GIP appropriateness.

## 2018-11-04 NOTE — HOSPICE RN NOTE
Met with patients daughter Christina Marks, consents for hospice signed. Plan to admit to Inpatient hospice for management of dyspnea when she is transferred to 4th floor.

## 2018-11-04 NOTE — SLP NOTE
Attempted to see patient per BSSE results and recommendations. However, per RN, pt with increased lethargy at this date and reportedly going \"hospice\" this admission. Not appropriate for SLP session at this time.  Will follow up with PO trials and educati

## 2018-11-04 NOTE — PROGRESS NOTES
[facility]    Progress Note    Laura Kebede Patient Status:  Inpatient    1932 MRN [id number]   Location [facility] 2W/SW Attending Oneal Grimm MD   Hosp Day # 1 PCP ABDULAZIZ Espitia MD       Subjective:   Laura Kebede 11/02/2018    INR 1.6 (H) 11/04/2018    T4F 1.09 01/26/2017    TSH 0.29 (L) 01/26/2017    TROP 0.04 (HH) 01/26/2017       Ct Brain Or Head (82339)    Result Date: 11/3/2018  CONCLUSION:  1.  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

## 2018-11-04 NOTE — CM/SW NOTE
11/4CM-MD orders received in regards to hospice. This Writer made a referral to Cata Weeks ([phone]) and sent out referral documents. This Writer informed the Patient's RN of the above.     -[facility] [id number]

## 2018-11-05 NOTE — SIGNIFICANT EVENT
Pt  at [address]. Resusitation not attempted as pt was DNR.     Time of Death 32 [phone] 12    Family Notified yes  Name and Relation jose daughter    lupe Calvo    [facility] of [facility] Notified yes (get Rep Name and Case Number) Paola Solders #[id number]     contacted

## 2018-11-05 NOTE — PLAN OF CARE
Patient Centered Care    • Patient preferences are identified and integrated in the patient's plan of care Completed          Patient had  prior to shift change handoff. Post mortum care was provided. All phone calls made. Family was notified.  Emoti

## 2018-12-03 NOTE — DISCHARGE SUMMARY
[place] [facility] - [facility]    Discharge Summary    Luz Elena Meza Patient Status:  Inpatient    1932 MRN [id number]   Location [facility] 4W/SW/SE Attending No att. providers found   [facility] Day # 1 PCP ABDULAZIZ Church MD     Date of Admission history provider  The daughter claimed that patient had left-sided pleural effusion at [facility] brother and she was treated with diuretics for CHF she was not a candidate for procedures as she was told  She had significant pain in her knees and she was st

## 2019-01-18 NOTE — DISCHARGE SUMMARY
[place] [facility] - [facility]    Discharge Summary    Arletta Duty Patient Status:  Inpatient    1932 MRN [id number]   Location [facility] 4W/SW/SE Attending No att. providers found   [address] Day # 1 PCP ABDULAZIZ Dodge MD     Date of Admission

## 2019-02-28 VITALS
DIASTOLIC BLOOD PRESSURE: 50 MMHG | BODY MASS INDEX: 41.54 KG/M2 | RESPIRATION RATE: 18 BRPM | WEIGHT: 220 LBS | SYSTOLIC BLOOD PRESSURE: 86 MMHG | HEIGHT: 61 IN | HEART RATE: 60 BPM

## 2019-02-28 VITALS
HEART RATE: 91 BPM | BODY MASS INDEX: 40.59 KG/M2 | HEIGHT: 61 IN | WEIGHT: 215 LBS | SYSTOLIC BLOOD PRESSURE: 112 MMHG | DIASTOLIC BLOOD PRESSURE: 64 MMHG | OXYGEN SATURATION: 95 %

## 2019-02-28 VITALS
DIASTOLIC BLOOD PRESSURE: 58 MMHG | HEIGHT: 60 IN | WEIGHT: 218 LBS | BODY MASS INDEX: 42.8 KG/M2 | HEART RATE: 89 BPM | SYSTOLIC BLOOD PRESSURE: 100 MMHG

## 2019-03-01 VITALS
BODY MASS INDEX: 45.12 KG/M2 | SYSTOLIC BLOOD PRESSURE: 120 MMHG | HEIGHT: 61 IN | OXYGEN SATURATION: 96 % | WEIGHT: 239 LBS | RESPIRATION RATE: 18 BRPM | DIASTOLIC BLOOD PRESSURE: 78 MMHG | HEART RATE: 88 BPM

## 2019-03-01 VITALS
BODY MASS INDEX: 44.37 KG/M2 | WEIGHT: 235 LBS | DIASTOLIC BLOOD PRESSURE: 40 MMHG | SYSTOLIC BLOOD PRESSURE: 86 MMHG | OXYGEN SATURATION: 96 % | HEIGHT: 61 IN | HEART RATE: 73 BPM

## 2019-03-14 RX ORDER — GABAPENTIN 100 MG/1
CAPSULE ORAL
COMMUNITY
Start: 2017-03-09

## 2019-03-14 RX ORDER — TORSEMIDE 20 MG/1
TABLET ORAL
COMMUNITY
Start: 2018-01-01

## 2019-03-14 RX ORDER — SIMVASTATIN 5 MG
TABLET ORAL
COMMUNITY
Start: 2017-06-06

## 2019-03-14 RX ORDER — POTASSIUM CHLORIDE 1500 MG/1
TABLET, EXTENDED RELEASE ORAL
COMMUNITY
Start: 2018-01-01

## 2019-03-14 RX ORDER — SENNOSIDES 8.6 MG
CAPSULE ORAL
COMMUNITY
Start: 2018-01-01

## 2019-03-14 RX ORDER — PREDNISONE 10 MG/1
TABLET ORAL
COMMUNITY
Start: 2018-01-01

## 2019-03-14 RX ORDER — ALBUTEROL SULFATE 90 UG/1
AEROSOL, METERED RESPIRATORY (INHALATION)
COMMUNITY
Start: 2017-06-06

## 2019-03-14 RX ORDER — METOLAZONE 2.5 MG/1
TABLET ORAL
COMMUNITY
Start: 2018-01-01

## 2019-03-14 RX ORDER — LANOLIN ALCOHOL/MO/W.PET/CERES
CREAM (GRAM) TOPICAL
COMMUNITY
Start: 2017-03-09

## 2019-03-14 RX ORDER — FERROUS SULFATE 325(65) MG
TABLET ORAL
COMMUNITY
Start: 2018-01-01

## 2019-03-14 RX ORDER — MULTIVITAMIN WITH IRON
TABLET ORAL
COMMUNITY
Start: 2017-03-09

## 2019-03-14 RX ORDER — METOPROLOL TARTRATE 50 MG/1
1 TABLET, FILM COATED ORAL 2 TIMES DAILY
COMMUNITY
Start: 2018-01-01

## 2019-03-14 RX ORDER — GUAIFENESIN 600 MG/1
1 TABLET, EXTENDED RELEASE ORAL 2 TIMES DAILY
COMMUNITY
Start: 2017-06-06

## 2019-03-14 RX ORDER — SPIRONOLACTONE 25 MG/1
TABLET ORAL
COMMUNITY
Start: 2018-01-01

## 2019-03-14 RX ORDER — MECLIZINE HYDROCHLORIDE 25 MG/1
1 TABLET ORAL 2 TIMES DAILY
COMMUNITY
Start: 2018-01-01

## 2019-11-18 NOTE — PLAN OF CARE
Patient Centered Care    • Patient preferences are identified and integrated in the patient's plan of care Progressing          Patient switched to hospice yesterday. Morphine drip infusing. 2L O2 NC for comfort.  visited.  Family updated on patient Closure 2 Information: This tab is for additional flaps and grafts, including complex repair and grafts and complex repair and flaps. You can also specify a different location for the additional defect, if the location is the same you do not need to select a new one. We will insert the automated text for the repair you select below just as we do for solitary flaps and grafts. Please note that at this time if you select a location with a different insurance zone you will need to override the ICD10 and CPT if appropriate.

## 2021-12-16 NOTE — TELEPHONE ENCOUNTER
Spoke with kimi recinos and Dr Steve Rae recommendations given. Dermatology office number given to pt to schedule. Pt daughter will call office back if unable to schedule pt asap. Detail Level: Simple Size Of Lesion: 4mm x 5mm

## (undated) NOTE — MR AVS SNAPSHOT
[facility] [address]  [address], [address]  [address] [facility]  [phone]               Thank you for choosing us for your health care visit with ABDULAZIZ Chu MD.  We are glad to serve you and happy to provide you with this summar This list is accurate as of: 2/6/17 12:26 PM.  Always use your most recent med list.                benzonatate 100 MG Caps   Take 1 capsule (100 mg total) by mouth 3 (three) times daily.    Commonly known as:  TESSALON           Calcium Carbonate-Vitamin D What changed:  Another medication with the same name was added. Make sure you understand how and when to take each. Commonly known as:  ZOCOR           * simvastatin 5 MG Tabs   Take 1 tablet (5 mg total) by mouth nightly. What changed:   You were alrea Diagnoses:  Abnormal MRI scan, head   Order:  Neurosurgery - Internal    [facility]   [address] [facility]   [address] [address] [address]         Follow-up Instructions     Return in about 3 months (around 5/6/2017).          Referral Orders or be assured that none are required. You can then schedule your appointment. Failure to obtain required authorization numbers can create reimbursement difficulties for you.     Assoc Dx:  Acute on chronic congestive heart failure, unspecified congestive he

## (undated) NOTE — MR AVS SNAPSHOT
[facility] [address]  [address], [address]  [phone]               Thank you for choosing us for your health care visit with ABDULAZIZ Sequeira MD.  We are glad to serve you and happy to provide you with this summary of Commonly known as:  FOLVITE           furosemide 40 MG Tabs   Take 1 tablet (40 mg total) by mouth daily. Commonly known as:  LASIX           gabapentin 100 MG Caps   Take 100 mg by mouth nightly.    Commonly known as:  NEURONTIN           HYDROcodone-ace

## (undated) NOTE — IP AVS SNAPSHOT
[address]  [address] [facility] [phone]                Discharge Summary   1/26/2017    Zoya Gibson           Admission Information        Provider Department    1/26/2017 Elizabeth Salazar MD [facility] 3w/Sw predniSONE 20 MG Tabs   Commonly known as:  Jared Grant   Next dose due:  1/31/17 morning          Take 2 tablets daily for 4 days, then take 1 tablet daily for 4 days, then stop.     Ravi Marcano                           rivaroxaban 15 MG Tabs   Last ti Last time this was given:  100 mg on 1/29/2017  9:12 PM   Commonly known as:  NEURONTIN   Next dose due:  1/30/17 evening          Take 100 mg by mouth nightly.                             HYDROcodone-acetaminophen 5-325 MG Tabs   Commonly known as:  Jamie Forth Commonly known as:  CARDIZEM           ZITHROMAX Z-ERIC 250 MG Tabs   Generic drug:  azithromycin                Where to Get Your Medications      Please  your prescriptions at the location directed by your doctor or nurse     Bring a paper prescrip 4.70 (01/28/17)  14.5 (01/28/17)  44.1 (01/28/17)  93.9 (01/28/17)  30.8 (01/28/17)  32.8 -- (01/28/17)  188 (01/28/17)  9.5    (01/27/17)  7.5 (01/27/17)  4.80 (01/27/17)  14.8 (01/27/17)  45.4 (01/27/17)  94.7 (01/27/17)  30.8 (01/27/17)  32.5  (01/27/17 you to explore options for quitting.     - If you have concerns related to behavioral health issues or thoughts of harming yourself, contact [facility] at [phone].     - If you don’t have insurance, Elaine Frank experience these side effects or respond to medications the same. Please call your provider or healthcare team if you have any questions regarding your medications while at home.          Blood Pressure and Cardiac Medications     DiltiaZEM HCl ER Coated Be Use:  Treat pain   Most common side effects: Constipation, drowsiness, dizziness, urinary retention (inability to urinate)   What to report to your healthcare team: Difficulty urinating, dizziness, no bowel movement in 2+ days, unresolved pain           G Use:  Treat conditions such as seizures, headaches, depression, anxiety and other neurologic conditions   Most common side effects:  Dizziness, drowsiness, headache, nausea/vomiting, somnolence   What to report to your healthcare team: Dizziness, Somnolen

## (undated) NOTE — MR AVS SNAPSHOT
[facility] [address]  [address], [address]  [phone]               Thank you for choosing us for your health care visit with ABDULAZIZ Edgar MD.  We are glad to serve you and happy to provide you with this summary of Atrial fibrillation, persistent    SADI treated with BiPAP    Chronic congestive heart failure, unspecified congestive heart failure type        Anemia, unspecified type          Instructions and Information about Your Health     None      Allergies as of - how to take this  - when to take this           Omeprazole 40 MG Cpdr   Take 1 capsule (40 mg total) by mouth daily.            Potassium Chloride ER 20 MEQ Tbcr   TAKE 1 TABLET BY MOUTH DAILY   Commonly known as:  K-DUR M20           predniSONE 20 MG Tab

## (undated) NOTE — MR AVS SNAPSHOT
[address] [facility] [address]  [phone]               Thank you for choosing us for your health care visit with Steve Oquendo MD.  We are glad to serve you and happy to provide you with this summary of your visit. 119/79 mmHg 93 97.6 °F (36.4 °C) (Oral) 5' 1\" (1.549 m) 243 lb (110.224 kg) 45.94 kg/m2    Breastfeeding?                    No              Current Medications          This list is accurate as of: 3/7/17 11:59 PM.  Always use your most recent med list. Take 40 mg by mouth daily. Potassium Chloride ER 20 MEQ Tbcr   Take 20 mg by mouth daily. predniSONE 20 MG Tabs   Take 1 tablet (20 mg total) by mouth daily.    Commonly known as:  aPt [place]  (90 Base) MCG/ACT Aer TOP FALL PREVENTION TIPS    INSIDE YOUR HOME   KITCHEN:  ? Use non skid mats only. ? Clean up spills as soon as they happen. ? Keep objects that you use often within easy reach.   BATHROOM:  ? Install grab bars on the bathroom walls beside the tub, hever

## (undated) NOTE — LETTER
Hospital Discharge Documentation  Please phone to schedule a hospital follow up appointment.     From: [address] Hospitalist's Office  Phone: [phone]    Patient discharged time/date: 1/30/2017  3:11 PM  Patient discharge disposition:  Home or Self Ht 5' (1.524 m)  Wt 244 lb (110.678 kg)  BMI 47.65 kg/m2  SpO2 97%    Gen:  NAD. A and O x  3  Obese  CV:   RRR.   No m/g/r  Pulm:   occaisiontal faint wheeze  Abd:   +bs, soft, NT, ND  LE:  No c/c/e  Neuro:  nonfocal      Reason for Admission:  Shortness revealed no sign of infection.  Influenza A, B and RSV were negative.  Her chest x-ray revealed cardiomegaly with perihilar consolidations most consistent with congestive heart failure and pulmonary edema.  The results of the x-ray raise the question of [facility] Quantity:  30 capsule   Refills:  1       Guaifenesin--10 MG/5ML Syrp   Commonly known as:  ROBITUSSIN DM        Take 5 mL by mouth every 4 (four) hours as needed.     Stop taking on:  2/9/2017   Quantity:  1 Bottle   Refills:  0       predniSONE 20 Take 100 mg by mouth nightly. Refills:  0       HYDROcodone-acetaminophen 5-325 MG Tabs   Commonly known as:  NORCO        Take 1 tablet by mouth every 6 (six) hours as needed for Pain.     Refills:  0       Ipratropium Bromide 0.02 % Soln   Commonly kn Specialty:  CARDIOLOGY    Why:  with 2d Echo    Contact information:    Pedro Miller [id number]          Follow up with [facility]. Go on 2/1/2017.     Why:  AFib follow up on 2/1/17 Jose

## (undated) NOTE — MR AVS SNAPSHOT
[facility] [address]  [address], [address]  [phone]               Thank you for choosing us for your health care visit with ABDULAZIZ Kothari MD.  We are glad to serve you and happy to provide you with this summary of Commonly known as:  FOLVITE           furosemide 40 MG Tabs   Take 1 tablet (40 mg total) by mouth daily. Commonly known as:  LASIX           gabapentin 100 MG Caps   Take 100 mg by mouth nightly.    Commonly known as:  NEURONTIN           HYDROcodone-ace

## (undated) NOTE — Clinical Note
02/01/2017     Dr. Deyvi Maier  [address]  [place] [address] [facility] [address]. Aimee New,     I saw Rosita Koch in the afib, heart failure clinic today for follow up for pneumonia with coronavirus, AE copd, new afib with RVR, rate controlled with

## (undated) NOTE — MR AVS SNAPSHOT
[facility] [address] [place] [address] [phone]               Thank you for choosing us for your health care visit with Jeannette Guzman NP.   We are glad to serve you and happy to provide yo potatoes. Please remember to read nutrition labels for sodium content. · Exercise daily as tolerated, with goal of doing moderate aerobic exercise like walking for about 30 minutes 5 days per week.  Start by walking at a slow to moderate pace for 5-10 m magnesium 250 MG Tabs   Take 250 mg by mouth daily. Meclizine HCl 12.5 MG Tabs   Take 12.5 mg by mouth 3 (three) times daily as needed.    Commonly known as:  ANTIVERT           metoprolol Tartrate 25 MG Tabs   Take 25 mg by mouth 2 (two) times d Anion Gap 9 0-18    Calculated Osmolality 287 275-295 mOsm/kg    GFR, Non- >60 >=60    GFR, -American >60 >=60      Chronic Kidney Disease: GFR <60 ml/min/1.73 m2  Kidney failure: GFR <15 ml/min/1.73 m2    The accuracy of the MDRD e walking, light jogging, cycling, swimming, etc.) for a goal of at least 150 minutes per week. Moderation of alcohol consumption Men: limit to <= 2 drinks* per day. Women and lighter weight persons: limit to <= 1 drink* per day.               Visit EDWARD

## (undated) NOTE — LETTER
[address] [place] [address] Rd, [place], [place]     AUTHORIZATION FOR SURGICAL OPERATION OR PROCEDURE    1.  I hereby authorize Dr. Devin Parada, my Physician(s) and whomever may be designated as the doctor's Assistant, to perform the following op 5. I consent to the photographing of procedure(s) to be performed for the purposes of advancing medicine, science and/or education, provided my identity is not revealed.  If the procedure has been videotaped, the physician/surgeon will obtain the original v (Witness signature)                                                                                                  (Date)                                (Time)  STATEMENT OF PHYSICIAN My signature below affirms that prior to the time of the procedure;  I

## (undated) NOTE — LETTER
[address] [place] [address]  Authorization for Invasive Procedures  1.  I hereby authorize___ Dr. Love Box ___, my physician and whomever may be designated as the doctor's assistant, to perform the following operation and/or procedure: performed for the purposes of advancing medicine, science, and/or education, provided my identity is not revealed. If the procedure has been videotaped, the physician/surgeon will obtain the original videotape.  The hospital will not be responsible for stor My signature below affirms that prior to the time of the procedure, I have explained to the patient and/or her legal representative, the risks and benefits involved in the proposed treatment and any reasonable alternative to the proposed treatment.  I have

## (undated) NOTE — MR AVS SNAPSHOT
[facility] [address] [place] [address] [phone]  [phone]               Thank you for choosing us for your health care visit with Francoise Vazquez NP.   We are glad to serve you and happy to provide yo · Heart healthy, decreased refined sugars, and  2000 mg sodium restricted diet and maintain fluids at at least 32 to 64 ounces per day.  Common high sodium foods include frozen dinners, soups (not homemade), some cereal, vegetable juice, canned vegetables, Commonly known as:  FOLVITE           furosemide 40 MG Tabs   Take 40 mg by mouth daily. Commonly known as:  LASIX           gabapentin 100 MG Caps   Take 100 mg by mouth nightly.    Commonly known as:  NEURONTIN           Guaifenesin--10 MG/5ML Syr Assoc Dx:  Atrial fibrillation ([address] [I48.91]           Basic Metabolic Panel (8)    Complete by:   Feb 07, 2017 (Approximate)    Assoc Dx:  Atrial fibrillation ([address] [I48.91], Hyperkalemia [E87.5]           CARD ECHO 2D DOPPLER (CPT=93306)    Complete by: GFR, Non- >60 >=60    GFR, -American >60 >=60      Chronic Kidney Disease: GFR <60 ml/min/1.73 m2  Kidney failure: GFR <15 ml/min/1.73 m2    The accuracy of the MDRD equation is not suitable for acute renal failure patients and it i Moderation of alcohol consumption Men: limit to <= 2 drinks* per day. Women and lighter weight persons: limit to <= 1 drink* per day. Your blood pressure indicates you may be at-risk for Hypertension.    Please consider the following Lifestyle Chey active are less likely to develop some chronic diseases than adults who are inactive.      HOW TO GET STARTED: HOW TO STAY MOTIVATED:   Start activities slowly and build up over time Do what you like   Get your heart pumping – brisk walking, biking, swimmin

## (undated) NOTE — MR AVS SNAPSHOT
[address]  [phone]               Thank you for choosing us for your [facility]. We are glad to serve you and happy to provide you with this summary of your visit. Take 1 tablet (325 mg total) by mouth daily with breakfast.           folic acid 953 MCG Tabs   Take 400 mcg by mouth daily. Commonly known as:  FOLVITE           furosemide 40 MG Tabs   Take 1 tablet (40 mg total) by mouth daily.    Commonly known as:  L